# Patient Record
Sex: FEMALE | Race: WHITE | Employment: OTHER | URBAN - METROPOLITAN AREA
[De-identification: names, ages, dates, MRNs, and addresses within clinical notes are randomized per-mention and may not be internally consistent; named-entity substitution may affect disease eponyms.]

---

## 2018-04-29 ENCOUNTER — HOSPITAL ENCOUNTER (EMERGENCY)
Age: 78
Discharge: HOME | End: 2018-04-30
Attending: EMERGENCY MEDICINE
Payer: MEDICARE

## 2018-04-29 DIAGNOSIS — S81.812A LACERATION OF LEFT LOWER EXTREMITY, INITIAL ENCOUNTER: Primary | ICD-10-CM

## 2018-04-29 PROCEDURE — 75810000293 HC SIMP/SUPERF WND  RPR: Performed by: EMERGENCY MEDICINE

## 2018-04-29 PROCEDURE — 99282 EMERGENCY DEPT VISIT SF MDM: CPT | Performed by: EMERGENCY MEDICINE

## 2018-04-29 PROCEDURE — 90715 TDAP VACCINE 7 YRS/> IM: CPT | Performed by: EMERGENCY MEDICINE

## 2018-04-29 PROCEDURE — 77030019895 HC PCKNG STRP IODO -A

## 2018-04-29 PROCEDURE — 74011250636 HC RX REV CODE- 250/636: Performed by: EMERGENCY MEDICINE

## 2018-04-29 PROCEDURE — 90471 IMMUNIZATION ADMIN: CPT | Performed by: EMERGENCY MEDICINE

## 2018-04-29 RX ADMIN — TETANUS TOXOID, REDUCED DIPHTHERIA TOXOID AND ACELLULAR PERTUSSIS VACCINE, ADSORBED 0.5 ML: 5; 2.5; 8; 8; 2.5 SUSPENSION INTRAMUSCULAR at 23:20

## 2018-04-30 VITALS
OXYGEN SATURATION: 98 % | HEIGHT: 62 IN | HEART RATE: 72 BPM | BODY MASS INDEX: 18.77 KG/M2 | WEIGHT: 102 LBS | SYSTOLIC BLOOD PRESSURE: 169 MMHG | TEMPERATURE: 99 F | DIASTOLIC BLOOD PRESSURE: 85 MMHG | RESPIRATION RATE: 16 BRPM

## 2018-04-30 NOTE — ED TRIAGE NOTES
Patient arrives with lower leg laceration. States it happened earlier today. States she ran into a metal part of a boat. States that she has not had a tetanus immunization recently.

## 2018-04-30 NOTE — ED PROVIDER NOTES
HPI Comments: Hit left lower shin region on blunt object/ boat. Has \"V\" laceration that points downward. Covered with tissue paper and some tape. States was not contaminated    Patient is a 65 y.o. female presenting with skin laceration. The history is provided by the patient and a relative (son). Laceration    The incident occurred 3 to 5 hours ago. The laceration is located on the left leg. The laceration is 8 cm in size. The injury mechanism is a blunt object. The pain is at a severity of 0/10. The patient is experiencing no pain. The patient's last tetanus shot was more than 10 years ago. Past Medical History:   Diagnosis Date    CAD (coronary artery disease)     stents    Hypertension        Past Surgical History:   Procedure Laterality Date    HX HYSTERECTOMY           History reviewed. No pertinent family history. Social History     Social History    Marital status:      Spouse name: N/A    Number of children: N/A    Years of education: N/A     Occupational History    Not on file. Social History Main Topics    Smoking status: Former Smoker    Smokeless tobacco: Not on file    Alcohol use No    Drug use: No    Sexual activity: Not on file     Other Topics Concern    Not on file     Social History Narrative    No narrative on file         ALLERGIES: Review of patient's allergies indicates no known allergies. Review of Systems   Constitutional: Negative for chills and fever. Skin: Positive for wound. Neurological: Negative. Psychiatric/Behavioral: Negative for confusion and decreased concentration. All other systems reviewed and are negative. Vitals:    04/29/18 2211   BP: (!) 201/86   Pulse: 76   Resp: 18   Temp: 99 °F (37.2 °C)   SpO2: 97%   Weight: 46.3 kg (102 lb)   Height: 5' 2\" (1.575 m)            Physical Exam   Constitutional: She appears well-developed and well-nourished. No distress. HENT:   Head: Atraumatic. Eyes: No scleral icterus.    Neck: Neck supple. Cardiovascular: Normal rate. Pulmonary/Chest: Effort normal. No respiratory distress. Abdominal: She exhibits no distension. Musculoskeletal:        Legs:  Skin: Skin is warm and dry. Laceration noted. Psychiatric: Thought content normal.   Nursing note and vitals reviewed. MDM  Number of Diagnoses or Management Options  Laceration of left lower extremity, initial encounter:   Diagnosis management comments: Very thin skin. Area cleaned and not really suitable to sew. Medial with Dermabond and lateral steristripped    Tetanus updated       Amount and/or Complexity of Data Reviewed  Obtain history from someone other than the patient: yes (son)    Risk of Complications, Morbidity, and/or Mortality  Presenting problems: moderate  Diagnostic procedures: minimal  Management options: low    Patient Progress  Patient progress: improved        ED Course       Wound Repair  Date/Time: 4/29/2018 10:30 PM  Performed by: attendingPreparation: skin prepped with Shur-Clens  Pre-procedure re-eval: Immediately prior to the procedure, the patient was reevaluated and found suitable for the planned procedure and any planned medications. Time out: Immediately prior to the procedure a time out was called to verify the correct patient, procedure, equipment, staff and marking as appropriate. .  Location details: right leg and left leg  Wound length:2.6 - 7.5 cm  Skin closure: Steri-Strips and glue  Patient tolerance: Patient tolerated the procedure well with no immediate complications

## 2018-04-30 NOTE — DISCHARGE INSTRUCTIONS
Cuts Closed With Adhesives: Care Instructions  Your Care Instructions  A cut can happen anywhere on your body. The doctor used an adhesive to close the cut. When the adhesive dries, it forms a film that holds the edges of the cut together. Skin adhesives are sometimes called liquid stitches. If the cut went deep and through the skin, the doctor may have put in a layer of stitches below the adhesive. The deeper layer of stitches brings the deep part of the cut together. These stitches will dissolve and don't need to be removed. You don't see the stitches, only the adhesive. You may have a bandage. The doctor has checked you carefully, but problems can develop later. If you notice any problems or new symptoms, get medical treatment right away. Follow-up care is a key part of your treatment and safety. Be sure to make and go to all appointments, and call your doctor if you are having problems. It's also a good idea to know your test results and keep a list of the medicines you take. How can you care for yourself at home? · Keep the cut dry for the first 24 to 48 hours. After this, you can shower if your doctor okays it. Pat the cut dry. · Don't soak the cut, such as in a bathtub. Your doctor will tell you when it's safe to get the cut wet. · If your doctor told you how to care for your cut, follow your doctor's instructions. If you did not get instructions, follow this general advice:  ¨ Do not put any kind of ointment, cream, or lotion over the area. This can make the adhesive fall off too soon. ¨ After the first 24 to 48 hours, wash around the cut with clean water 2 times a day. Do not use hydrogen peroxide or alcohol, which can slow healing. ¨ If the doctor told you to use a bandage, put on a new bandage after cleaning the cut or if the bandage gets wet or dirty. · Prop up the sore area on a pillow anytime you sit or lie down during the next 3 days. Try to keep it above the level of your heart. This will help reduce swelling. · Leave the skin adhesive on your skin until it falls off on its own. This may take 5 to 10 days. · Do not scratch, rub, or pick at the adhesive. · Do not put the sticky part of a bandage directly on the adhesive. · Avoid any activity that could cause your cut to reopen. · Be safe with medicines. Read and follow all instructions on the label. ¨ If the doctor gave you a prescription medicine for pain, take it as prescribed. ¨ If you are not taking a prescription pain medicine, ask your doctor if you can take an over-the-counter medicine. When should you call for help? Call your doctor now or seek immediate medical care if:  ? · You have new pain, or your pain gets worse. ? · The skin near the cut is cold or pale or changes color. ? · You have tingling, weakness, or numbness near the cut.   ? · The cut starts to bleed. ? · You have trouble moving the area near the cut.   ? · You have symptoms of infection, such as:  ¨ Increased pain, swelling, warmth, or redness around the cut. ¨ Red streaks leading from the cut. ¨ Pus draining from the cut. ¨ A fever. ? Watch closely for changes in your health, and be sure to contact your doctor if:  ? · The cut reopens. ? · You do not get better as expected. Where can you learn more? Go to http://kassy-kvng.info/. Enter P174 in the search box to learn more about \"Cuts Closed With Adhesives: Care Instructions. \"  Current as of: March 20, 2017  Content Version: 11.4  © 3763-2228 "[employer]. Care instructions adapted under license by [employer] (which disclaims liability or warranty for this information). If you have questions about a medical condition or this instruction, always ask your healthcare professional. [address] any warranty or liability for your use of this information.        Skin Tears: Care Instructions  Your Care Instructions  As we get older, our skin gets drier and more fragile. Sometimes this can cause the outer layers of skin to split and tear open. Skin tears are treated in different ways. In some cases, doctors use pieces of tape called Steri-Strips to pull the skin together and help it heal. Other times, it's best to leave the tear open and cover it with a special wound-care bandage. Skin tears are usually not serious. They usually heal in a few weeks. But how long you take to heal depends on your body and the type of tear you have. Sometimes the torn piece of skin is used to protect the wound while it heals. But that piece of skin does not heal. It may fall off on its own. Or the doctor may remove it. As your tear heals, it's important to keep it clean to help prevent infection. The doctor has checked you carefully, but problems can develop later. If you notice any problems or new symptoms, get medical treatment right away. Follow-up care is a key part of your treatment and safety. Be sure to make and go to all appointments, and call your doctor if you are having problems. It's also a good idea to know your test results and keep a list of the medicines you take. How can you care for yourself at home? · If you have pain, ask your doctor if you can take an over-the-counter pain medicine, such as acetaminophen (Tylenol), ibuprofen (Advil, Motrin), or naproxen (Aleve). Be safe with medicines. Read and follow all instructions on the label. · If you have a bandage, follow your doctor's instructions for changing it. · If you have Steri-Strips, leave them on until they fall off. · Follow your doctor's instructions about bathing. · Gently wash the skin tear with plain water 2 times a day. Do not rub the area. · Let the area air dry. Or you can pat it carefully with a soft towel. When should you call for help?   Call your doctor now or seek immediate medical care if:  ? · You have signs of infection, such as:  ¨ Increased pain, swelling, warmth, or redness around the tear. ¨ Red streaks leading from the tear. ¨ Pus draining from the tear. ¨ A fever. ? · The tear starts to bleed a lot. Small amounts of blood are normal.   ? Watch closely for changes in your health, and be sure to contact your doctor if:  ? · You do not get better as expected. Where can you learn more? Go to http://kassy-kvng.info/. Enter I123 in the search box to learn more about \"Skin Tears: Care Instructions. \"  Current as of: March 20, 2017  Content Version: 11.4  © 0838-5094 [employer]. Care instructions adapted under license by [employer] (which disclaims liability or warranty for this information). If you have questions about a medical condition or this instruction, always ask your healthcare professional. [address] any warranty or liability for your use of this information.

## 2018-06-23 ENCOUNTER — APPOINTMENT (OUTPATIENT)
Dept: GENERAL RADIOLOGY | Age: 78
DRG: 480 | End: 2018-06-23
Attending: EMERGENCY MEDICINE
Payer: MEDICARE

## 2018-06-23 ENCOUNTER — ANESTHESIA EVENT (OUTPATIENT)
Dept: SURGERY | Age: 78
DRG: 480 | End: 2018-06-23
Payer: MEDICARE

## 2018-06-23 ENCOUNTER — HOSPITAL ENCOUNTER (INPATIENT)
Age: 78
LOS: 3 days | Discharge: NURSING, REHAB OR LONG-TERM CARE | DRG: 480 | End: 2018-06-26
Attending: EMERGENCY MEDICINE | Admitting: INTERNAL MEDICINE
Payer: MEDICARE

## 2018-06-23 DIAGNOSIS — S72.002A CLOSED FRACTURE OF NECK OF LEFT FEMUR, INITIAL ENCOUNTER (HCC): Primary | ICD-10-CM

## 2018-06-23 PROBLEM — I10 HTN (HYPERTENSION): Status: ACTIVE | Noted: 2018-06-23

## 2018-06-23 PROBLEM — E78.5 DYSLIPIDEMIA: Status: ACTIVE | Noted: 2018-06-23

## 2018-06-23 PROBLEM — F41.9 ANXIETY: Status: ACTIVE | Noted: 2018-06-23

## 2018-06-23 PROBLEM — Z87.81 HISTORY OF PELVIC FRACTURE: Status: ACTIVE | Noted: 2018-06-23

## 2018-06-23 PROBLEM — M54.9 BACK PAIN: Status: ACTIVE | Noted: 2018-06-23

## 2018-06-23 PROBLEM — R09.02 HYPOXIA: Status: ACTIVE | Noted: 2018-06-23

## 2018-06-23 PROBLEM — S72.009A HIP FRACTURE (HCC): Status: ACTIVE | Noted: 2018-06-23

## 2018-06-23 PROBLEM — F03.90 DEMENTIA (HCC): Status: ACTIVE | Noted: 2018-06-23

## 2018-06-23 PROBLEM — M81.0 OSTEOPOROSIS: Status: ACTIVE | Noted: 2018-06-23

## 2018-06-23 PROBLEM — G62.9 NEUROPATHY: Status: ACTIVE | Noted: 2018-06-23

## 2018-06-23 PROBLEM — J40 BRONCHITIS: Status: ACTIVE | Noted: 2018-06-23

## 2018-06-23 PROBLEM — I25.10 CAD (CORONARY ARTERY DISEASE): Status: ACTIVE | Noted: 2018-06-23

## 2018-06-23 LAB
ANION GAP SERPL CALC-SCNC: 9 MMOL/L (ref 7–16)
ATRIAL RATE: 68 BPM
BACTERIA URNS QL MICRO: 0 /HPF
BUN SERPL-MCNC: 16 MG/DL (ref 8–23)
CALCIUM SERPL-MCNC: 9 MG/DL (ref 8.3–10.4)
CALCIUM SERPL-MCNC: 9.2 MG/DL (ref 8.3–10.4)
CALCULATED P AXIS, ECG09: 81 DEGREES
CALCULATED R AXIS, ECG10: 70 DEGREES
CALCULATED T AXIS, ECG11: -65 DEGREES
CASTS URNS QL MICRO: 0 /LPF
CHLORIDE SERPL-SCNC: 102 MMOL/L (ref 98–107)
CO2 SERPL-SCNC: 28 MMOL/L (ref 21–32)
CREAT SERPL-MCNC: 0.73 MG/DL (ref 0.6–1)
CRYSTALS URNS QL MICRO: 0 /LPF
DIAGNOSIS, 93000: NORMAL
EPI CELLS #/AREA URNS HPF: NORMAL /HPF
ERYTHROCYTE [DISTWIDTH] IN BLOOD BY AUTOMATED COUNT: 13.6 % (ref 11.9–14.6)
GLUCOSE SERPL-MCNC: 103 MG/DL (ref 65–100)
HCT VFR BLD AUTO: 36.1 % (ref 35.8–46.3)
HGB BLD-MCNC: 12.4 G/DL (ref 11.7–15.4)
INR PPP: 1
MCH RBC QN AUTO: 31.5 PG (ref 26.1–32.9)
MCHC RBC AUTO-ENTMCNC: 34.3 G/DL (ref 31.4–35)
MCV RBC AUTO: 91.6 FL (ref 79.6–97.8)
MUCOUS THREADS URNS QL MICRO: 0 /LPF
P-R INTERVAL, ECG05: 140 MS
PLATELET # BLD AUTO: 173 K/UL (ref 150–450)
PMV BLD AUTO: 10.4 FL (ref 10.8–14.1)
POTASSIUM SERPL-SCNC: 3.9 MMOL/L (ref 3.5–5.1)
PREALB SERPL-MCNC: 14.5 MG/DL (ref 18–35.7)
PROTHROMBIN TIME: 12.6 SEC (ref 11.5–14.5)
PTH-INTACT SERPL-MCNC: 59 PG/ML (ref 18.5–88)
Q-T INTERVAL, ECG07: 382 MS
QRS DURATION, ECG06: 78 MS
QTC CALCULATION (BEZET), ECG08: 406 MS
RBC # BLD AUTO: 3.94 M/UL (ref 4.05–5.25)
RBC #/AREA URNS HPF: 0 /HPF
SODIUM SERPL-SCNC: 139 MMOL/L (ref 136–145)
VENTRICULAR RATE, ECG03: 68 BPM
WBC # BLD AUTO: 9.2 K/UL (ref 4.3–11.1)
WBC URNS QL MICRO: 0 /HPF

## 2018-06-23 PROCEDURE — 65270000029 HC RM PRIVATE

## 2018-06-23 PROCEDURE — 77010033678 HC OXYGEN DAILY

## 2018-06-23 PROCEDURE — 73552 X-RAY EXAM OF FEMUR 2/>: CPT

## 2018-06-23 PROCEDURE — 51702 INSERT TEMP BLADDER CATH: CPT | Performed by: EMERGENCY MEDICINE

## 2018-06-23 PROCEDURE — 77030036696 HC BOOT TRACT BUCKS S2SG -A

## 2018-06-23 PROCEDURE — 96374 THER/PROPH/DIAG INJ IV PUSH: CPT | Performed by: EMERGENCY MEDICINE

## 2018-06-23 PROCEDURE — 74011000302 HC RX REV CODE- 302: Performed by: INTERNAL MEDICINE

## 2018-06-23 PROCEDURE — 93005 ELECTROCARDIOGRAM TRACING: CPT | Performed by: EMERGENCY MEDICINE

## 2018-06-23 PROCEDURE — 80048 BASIC METABOLIC PNL TOTAL CA: CPT | Performed by: EMERGENCY MEDICINE

## 2018-06-23 PROCEDURE — 99285 EMERGENCY DEPT VISIT HI MDM: CPT | Performed by: EMERGENCY MEDICINE

## 2018-06-23 PROCEDURE — 86923 COMPATIBILITY TEST ELECTRIC: CPT | Performed by: EMERGENCY MEDICINE

## 2018-06-23 PROCEDURE — 74011250636 HC RX REV CODE- 250/636

## 2018-06-23 PROCEDURE — 85027 COMPLETE CBC AUTOMATED: CPT | Performed by: EMERGENCY MEDICINE

## 2018-06-23 PROCEDURE — 77030027138 HC INCENT SPIROMETER -A

## 2018-06-23 PROCEDURE — 82306 VITAMIN D 25 HYDROXY: CPT | Performed by: NURSE PRACTITIONER

## 2018-06-23 PROCEDURE — 86900 BLOOD TYPING SEROLOGIC ABO: CPT | Performed by: EMERGENCY MEDICINE

## 2018-06-23 PROCEDURE — 73502 X-RAY EXAM HIP UNI 2-3 VIEWS: CPT

## 2018-06-23 PROCEDURE — 77030005520 HC CATH URETH FOL38 BARD -A

## 2018-06-23 PROCEDURE — 77030020263 HC SOL INJ SOD CL0.9% LFCR 1000ML

## 2018-06-23 PROCEDURE — 85610 PROTHROMBIN TIME: CPT | Performed by: EMERGENCY MEDICINE

## 2018-06-23 PROCEDURE — 71045 X-RAY EXAM CHEST 1 VIEW: CPT

## 2018-06-23 PROCEDURE — 77030032490 HC SLV COMPR SCD KNE COVD -B

## 2018-06-23 PROCEDURE — 86580 TB INTRADERMAL TEST: CPT | Performed by: INTERNAL MEDICINE

## 2018-06-23 PROCEDURE — 83970 ASSAY OF PARATHORMONE: CPT | Performed by: NURSE PRACTITIONER

## 2018-06-23 PROCEDURE — 87086 URINE CULTURE/COLONY COUNT: CPT | Performed by: EMERGENCY MEDICINE

## 2018-06-23 PROCEDURE — 74011250636 HC RX REV CODE- 250/636: Performed by: INTERNAL MEDICINE

## 2018-06-23 PROCEDURE — 84134 ASSAY OF PREALBUMIN: CPT | Performed by: NURSE PRACTITIONER

## 2018-06-23 PROCEDURE — 94760 N-INVAS EAR/PLS OXIMETRY 1: CPT

## 2018-06-23 PROCEDURE — 74011250637 HC RX REV CODE- 250/637: Performed by: INTERNAL MEDICINE

## 2018-06-23 PROCEDURE — 81015 MICROSCOPIC EXAM OF URINE: CPT | Performed by: EMERGENCY MEDICINE

## 2018-06-23 PROCEDURE — 74011250637 HC RX REV CODE- 250/637: Performed by: NURSE PRACTITIONER

## 2018-06-23 PROCEDURE — 0T9B70Z DRAINAGE OF BLADDER WITH DRAINAGE DEVICE, VIA NATURAL OR ARTIFICIAL OPENING: ICD-10-PCS | Performed by: EMERGENCY MEDICINE

## 2018-06-23 PROCEDURE — 36415 COLL VENOUS BLD VENIPUNCTURE: CPT | Performed by: EMERGENCY MEDICINE

## 2018-06-23 PROCEDURE — 74011250636 HC RX REV CODE- 250/636: Performed by: NURSE PRACTITIONER

## 2018-06-23 RX ORDER — HYDROMORPHONE HYDROCHLORIDE 1 MG/ML
0.5 INJECTION, SOLUTION INTRAMUSCULAR; INTRAVENOUS; SUBCUTANEOUS
Status: DISCONTINUED | OUTPATIENT
Start: 2018-06-23 | End: 2018-06-24

## 2018-06-23 RX ORDER — GLUCOSAMINE SULFATE 1500 MG
1000 POWDER IN PACKET (EA) ORAL DAILY
COMMUNITY
End: 2018-06-26

## 2018-06-23 RX ORDER — SODIUM CHLORIDE 0.9 % (FLUSH) 0.9 %
5-10 SYRINGE (ML) INJECTION EVERY 8 HOURS
Status: DISCONTINUED | OUTPATIENT
Start: 2018-06-23 | End: 2018-06-24 | Stop reason: SDUPTHER

## 2018-06-23 RX ORDER — SODIUM CHLORIDE, SODIUM LACTATE, POTASSIUM CHLORIDE, CALCIUM CHLORIDE 600; 310; 30; 20 MG/100ML; MG/100ML; MG/100ML; MG/100ML
75 INJECTION, SOLUTION INTRAVENOUS
Status: COMPLETED | OUTPATIENT
Start: 2018-06-24 | End: 2018-06-24

## 2018-06-23 RX ORDER — CHOLECALCIFEROL (VITAMIN D3) 25 MCG
TABLET,CHEWABLE ORAL
COMMUNITY

## 2018-06-23 RX ORDER — ONDANSETRON 2 MG/ML
4 INJECTION INTRAMUSCULAR; INTRAVENOUS
Status: DISCONTINUED | OUTPATIENT
Start: 2018-06-23 | End: 2018-06-24 | Stop reason: SDUPTHER

## 2018-06-23 RX ORDER — CARVEDILOL 3.12 MG/1
3.12 TABLET ORAL 2 TIMES DAILY WITH MEALS
COMMUNITY

## 2018-06-23 RX ORDER — DIPHENHYDRAMINE HYDROCHLORIDE 50 MG/ML
12.5 INJECTION, SOLUTION INTRAMUSCULAR; INTRAVENOUS ONCE
Status: COMPLETED | OUTPATIENT
Start: 2018-06-23 | End: 2018-06-23

## 2018-06-23 RX ORDER — QUETIAPINE FUMARATE 25 MG/1
25 TABLET, FILM COATED ORAL
Status: DISCONTINUED | OUTPATIENT
Start: 2018-06-23 | End: 2018-06-26 | Stop reason: HOSPADM

## 2018-06-23 RX ORDER — LUBIPROSTONE 8 UG/1
8 CAPSULE, GELATIN COATED ORAL
COMMUNITY

## 2018-06-23 RX ORDER — ACETAMINOPHEN 325 MG/1
650 TABLET ORAL EVERY 8 HOURS
Status: DISCONTINUED | OUTPATIENT
Start: 2018-06-23 | End: 2018-06-24 | Stop reason: SDUPTHER

## 2018-06-23 RX ORDER — CEFAZOLIN SODIUM/WATER 2 G/20 ML
2 SYRINGE (ML) INTRAVENOUS
Status: COMPLETED | OUTPATIENT
Start: 2018-06-24 | End: 2018-06-24

## 2018-06-23 RX ORDER — PREGABALIN 50 MG/1
50 CAPSULE ORAL
COMMUNITY

## 2018-06-23 RX ORDER — NALOXONE HYDROCHLORIDE 0.4 MG/ML
0.4 INJECTION, SOLUTION INTRAMUSCULAR; INTRAVENOUS; SUBCUTANEOUS AS NEEDED
Status: DISCONTINUED | OUTPATIENT
Start: 2018-06-23 | End: 2018-06-26 | Stop reason: HOSPADM

## 2018-06-23 RX ORDER — ROSUVASTATIN CALCIUM 10 MG/1
10 TABLET, COATED ORAL
COMMUNITY
End: 2018-06-26

## 2018-06-23 RX ORDER — TRAMADOL HYDROCHLORIDE 50 MG/1
50 TABLET ORAL
Status: DISCONTINUED | OUTPATIENT
Start: 2018-06-23 | End: 2018-06-23 | Stop reason: SDUPTHER

## 2018-06-23 RX ORDER — QUETIAPINE FUMARATE 25 MG/1
TABLET, FILM COATED ORAL
COMMUNITY

## 2018-06-23 RX ORDER — AZITHROMYCIN 250 MG/1
250 TABLET, FILM COATED ORAL DAILY
COMMUNITY
End: 2018-06-26

## 2018-06-23 RX ORDER — ONDANSETRON 2 MG/ML
4 INJECTION INTRAMUSCULAR; INTRAVENOUS
Status: DISPENSED | OUTPATIENT
Start: 2018-06-23 | End: 2018-06-23

## 2018-06-23 RX ORDER — HYDRALAZINE HYDROCHLORIDE 20 MG/ML
10 INJECTION INTRAMUSCULAR; INTRAVENOUS
Status: DISCONTINUED | OUTPATIENT
Start: 2018-06-23 | End: 2018-06-26 | Stop reason: HOSPADM

## 2018-06-23 RX ORDER — HYDROMORPHONE HYDROCHLORIDE 2 MG/ML
INJECTION, SOLUTION INTRAMUSCULAR; INTRAVENOUS; SUBCUTANEOUS
Status: COMPLETED
Start: 2018-06-23 | End: 2018-06-23

## 2018-06-23 RX ORDER — ASPIRIN 81 MG/1
TABLET ORAL DAILY
COMMUNITY

## 2018-06-23 RX ORDER — RANOLAZINE 500 MG/1
500 TABLET, EXTENDED RELEASE ORAL 2 TIMES DAILY
COMMUNITY

## 2018-06-23 RX ORDER — RANOLAZINE 500 MG/1
500 TABLET, EXTENDED RELEASE ORAL 2 TIMES DAILY
Status: DISCONTINUED | OUTPATIENT
Start: 2018-06-23 | End: 2018-06-26 | Stop reason: HOSPADM

## 2018-06-23 RX ORDER — HYDROMORPHONE HYDROCHLORIDE 2 MG/ML
0.5 INJECTION, SOLUTION INTRAMUSCULAR; INTRAVENOUS; SUBCUTANEOUS
Status: COMPLETED | OUTPATIENT
Start: 2018-06-23 | End: 2018-06-23

## 2018-06-23 RX ORDER — SODIUM CHLORIDE 9 MG/ML
2000 INJECTION, SOLUTION INTRAVENOUS CONTINUOUS
Status: DISCONTINUED | OUTPATIENT
Start: 2018-06-23 | End: 2018-06-25

## 2018-06-23 RX ORDER — CARVEDILOL 3.12 MG/1
3.12 TABLET ORAL 2 TIMES DAILY WITH MEALS
Status: DISCONTINUED | OUTPATIENT
Start: 2018-06-23 | End: 2018-06-26 | Stop reason: HOSPADM

## 2018-06-23 RX ORDER — HYDROMORPHONE HYDROCHLORIDE 2 MG/ML
0.5 INJECTION, SOLUTION INTRAMUSCULAR; INTRAVENOUS; SUBCUTANEOUS
Status: DISCONTINUED | OUTPATIENT
Start: 2018-06-23 | End: 2018-06-23 | Stop reason: SDUPTHER

## 2018-06-23 RX ORDER — OXYCODONE HYDROCHLORIDE 5 MG/1
5 TABLET ORAL
Status: DISCONTINUED | OUTPATIENT
Start: 2018-06-23 | End: 2018-06-24 | Stop reason: SDUPTHER

## 2018-06-23 RX ORDER — TRAMADOL HYDROCHLORIDE 50 MG/1
50 TABLET ORAL
Status: DISCONTINUED | OUTPATIENT
Start: 2018-06-23 | End: 2018-06-26 | Stop reason: HOSPADM

## 2018-06-23 RX ORDER — GALANTAMINE HYDROBROMIDE 8 MG/1
8 TABLET, FILM COATED ORAL 2 TIMES DAILY
COMMUNITY

## 2018-06-23 RX ORDER — SODIUM CHLORIDE 0.9 % (FLUSH) 0.9 %
5-10 SYRINGE (ML) INJECTION AS NEEDED
Status: DISCONTINUED | OUTPATIENT
Start: 2018-06-23 | End: 2018-06-24 | Stop reason: SDUPTHER

## 2018-06-23 RX ADMIN — Medication 10 ML: at 13:43

## 2018-06-23 RX ADMIN — HYDROMORPHONE HYDROCHLORIDE 0.5 MG: 2 INJECTION, SOLUTION INTRAMUSCULAR; INTRAVENOUS; SUBCUTANEOUS at 06:49

## 2018-06-23 RX ADMIN — TUBERCULIN PURIFIED PROTEIN DERIVATIVE 5 UNITS: 5 INJECTION INTRADERMAL at 13:33

## 2018-06-23 RX ADMIN — DIPHENHYDRAMINE HYDROCHLORIDE 12.5 MG: 50 INJECTION, SOLUTION INTRAMUSCULAR; INTRAVENOUS at 13:33

## 2018-06-23 RX ADMIN — HYDROMORPHONE HYDROCHLORIDE 0.5 MG: 1 INJECTION, SOLUTION INTRAMUSCULAR; INTRAVENOUS; SUBCUTANEOUS at 20:55

## 2018-06-23 RX ADMIN — Medication 5 ML: at 21:02

## 2018-06-23 RX ADMIN — SODIUM CHLORIDE 2000 ML: 900 INJECTION, SOLUTION INTRAVENOUS at 11:22

## 2018-06-23 RX ADMIN — HYDROMORPHONE HYDROCHLORIDE 0.5 MG: 2 INJECTION, SOLUTION INTRAMUSCULAR; INTRAVENOUS; SUBCUTANEOUS at 10:39

## 2018-06-23 RX ADMIN — AZITHROMYCIN MONOHYDRATE 500 MG: 500 INJECTION, POWDER, LYOPHILIZED, FOR SOLUTION INTRAVENOUS at 13:33

## 2018-06-23 RX ADMIN — QUETIAPINE FUMARATE 25 MG: 25 TABLET ORAL at 21:01

## 2018-06-23 RX ADMIN — HYDROMORPHONE HYDROCHLORIDE 0.5 MG: 1 INJECTION, SOLUTION INTRAMUSCULAR; INTRAVENOUS; SUBCUTANEOUS at 17:08

## 2018-06-23 RX ADMIN — ACETAMINOPHEN 650 MG: 325 TABLET ORAL at 21:01

## 2018-06-23 NOTE — ANESTHESIA PREPROCEDURE EVALUATION
Anesthetic History   No history of anesthetic complications            Review of Systems / Medical History  Patient summary reviewed and pertinent labs reviewed    Pulmonary    COPD: moderate      Smoker (quit 30 years)         Neuro/Psych         Dementia (pleasantly demented)     Cardiovascular    Hypertension: well controlled          CAD, cardiac stents (8 stents, will give asa this am.) and hyperlipidemia    Exercise tolerance: <4 METS  Comments: 2015 echo: Global left ventricular wall motion and contractility are within   normal   limits. The estimated LV ejection fraction is 60-65%. Grade 1 diastolic dysfunction is present consistent with impaired LV  relaxation. GI/Hepatic/Renal                Endo/Other  Within defined limits           Other Findings   Comments: Saw pcp 6/22 for bronchitis, given z-pack. Physical Exam    Airway  Mallampati: III  TM Distance: < 4 cm  Neck ROM: normal range of motion   Mouth opening: Normal     Cardiovascular    Rhythm: regular  Rate: normal         Dental         Pulmonary    Rhonchi:bilateral             Abdominal         Other Findings            Anesthetic Plan    ASA: 3  Anesthesia type: spinal            Anesthetic plan and risks discussed with: Patient and Family      Discussed btoh Spinal, sedation, and GA.

## 2018-06-23 NOTE — ED TRIAGE NOTES
Family stated that patient was walking to the bathroom and then wanted to go to the couch but fell before she got there. Family brought patient to ER by car.

## 2018-06-23 NOTE — ED PROVIDER NOTES
Patient is a 65 y.o. female presenting with fall. The history is provided by the patient. Fall   The accident occurred less than 1 hour ago. The fall occurred while walking. She fell from a height of ground level. She landed on hard floor. There was no blood loss. The point of impact was the left hip. The pain is present in the left hip. The pain is severe. She was not ambulatory at the scene. Pertinent negatives include no numbness, no nausea, no vomiting, no headaches (no loss of consciousness), no loss of consciousness, no tingling and no laceration. The risk factors include recurrent falls. The symptoms are aggravated by activity and use of injured limb. She has tried nothing for the symptoms. Past Medical History:   Diagnosis Date    CAD (coronary artery disease)     stents    Hypertension        Past Surgical History:   Procedure Laterality Date    HX HYSTERECTOMY           No family history on file. Social History     Social History    Marital status:      Spouse name: N/A    Number of children: N/A    Years of education: N/A     Occupational History    Not on file. Social History Main Topics    Smoking status: Former Smoker    Smokeless tobacco: Not on file    Alcohol use No    Drug use: No    Sexual activity: Not on file     Other Topics Concern    Not on file     Social History Narrative    No narrative on file         ALLERGIES: Review of patient's allergies indicates no known allergies. Review of Systems   HENT: Negative for facial swelling. Respiratory: Negative for shortness of breath. Cardiovascular: Negative for chest pain. Gastrointestinal: Negative for nausea and vomiting. Musculoskeletal: Negative for back pain and neck pain. Neurological: Negative for tingling, loss of consciousness, weakness, numbness and headaches (no loss of consciousness).        Vitals:    06/23/18 0638   BP: (!) 217/103            Physical Exam   Constitutional: She is oriented to person, place, and time. She appears well-developed and well-nourished. HENT:   Head: Normocephalic and atraumatic. Eyes: Conjunctivae and EOM are normal. Pupils are equal, round, and reactive to light. Neck: Trachea normal. No spinous process tenderness and no muscular tenderness present. Cardiovascular: Normal rate, regular rhythm, normal heart sounds and intact distal pulses. Pulmonary/Chest: Effort normal and breath sounds normal. She exhibits no tenderness. Abdominal: Soft. Bowel sounds are normal. She exhibits no distension. There is no tenderness. There is no rebound and no guarding. Musculoskeletal: Normal range of motion. She exhibits tenderness. She exhibits no edema. Cervical back: She exhibits tenderness. Neurological: She is alert and oriented to person, place, and time. She has normal strength. No sensory deficit. GCS eye subscore is 4. GCS verbal subscore is 5. GCS motor subscore is 6. Skin: Skin is warm and dry. No laceration noted. Nursing note and vitals reviewed. MDM  Number of Diagnoses or Management Options  Diagnosis management comments: 69-year-old female presents with a shortened and externally rotated  Left leg after a fall landing on her left hip. Left hip fracture clinically suspected. Pain medication ordered. X-rays labwork EKG ordered. No concern for rhabdomyolysis as the patient's son heard her fall and got to her immediately. The fall was about 30 minutes prior to arrival.  I encountered the patient and her son in the driveway just outside the emergency department on my way into my morning shift.        Amount and/or Complexity of Data Reviewed  Clinical lab tests: ordered and reviewed (Results for orders placed or performed during the hospital encounter of 06/23/18  -CBC W/O DIFF       Result                                            Value                         Ref Range                       WBC 9.2                           4.3 - 11.1 K/uL                 RBC                                               3.94 (L)                      4.05 - 5.25 M/uL                HGB                                               12.4                          11.7 - 15.4 g/dL                HCT                                               36.1                          35.8 - 46.3 %                   MCV                                               91.6                          79.6 - 97.8 FL                  MCH                                               31.5                          26.1 - 32.9 PG                  MCHC                                              34.3                          31.4 - 35.0 g/dL                RDW                                               13.6                          11.9 - 14.6 %                   PLATELET                                          173                           150 - 450 K/uL                  MPV                                               10.4 (L)                      10.8 - 14.1 FL             -METABOLIC PANEL, BASIC       Result                                            Value                         Ref Range                       Sodium                                            139                           136 - 145 mmol/L                Potassium                                         3.9                           3.5 - 5.1 mmol/L                Chloride                                          102                           98 - 107 mmol/L                 CO2                                               28                            21 - 32 mmol/L                  Anion gap                                         9                             7 - 16 mmol/L                   Glucose                                           103 (H)                       65 - 100 mg/dL                  BUN                                               16 8 - 23 MG/DL                    Creatinine                                        0.73                          0.6 - 1.0 MG/DL                 GFR est AA                                        >60                           >60 ml/min/1.73m2               GFR est non-AA                                    >60                           >60 ml/min/1.73m2               Calcium                                           9.0                           8.3 - 10.4 MG/DL           -PROTHROMBIN TIME + INR       Result                                            Value                         Ref Range                       Prothrombin time                                  12.6                          11.5 - 14.5 sec                 INR                                               1.0                                                      )  Tests in the radiology section of CPT®: ordered and reviewed (Xr Chest Sngl V    Result Date: 6/23/2018  Left femur: 6/23/2018 INDICATION: Trauma 4 images are remarkable for left ischial pubic ramus fractures. There is a subacute fracture of the left femoral neck with varus angulation of the distal fracture fragments. IMPRESSION: Subacute left femoral neck fracture. Portable chest x-ray Lung fields clear. There is lower thoracic scoliosis. Cardiac silhouette and soft tissues are intact IMPRESSION: lower thoracic scoliosis, clear lung fields     Xr Hip Lt W Or Wo Pelv 2-3 Vws    Result Date: 6/23/2018  Left hip June 23, 2018 Indication pain 3 views demonstrate a remote left ischial pubic ramus fracture. There is an acute displaced fracture at the base of the left femoral neck with varus angulation of the distal fracture fragment. Soft tissues are edematous     IMPRESSION: Left femoral neck fracture, remote pelvic fractures    Xr Femur Lt 2 V    Result Date: 6/23/2018  Left femur: 6/23/2018 INDICATION: Trauma 4 images are remarkable for left ischial pubic ramus fractures. There is a subacute fracture of the left femoral neck with varus angulation of the distal fracture fragments. IMPRESSION: Subacute left femoral neck fracture. Portable chest x-ray Lung fields clear. There is lower thoracic scoliosis.  Cardiac silhouette and soft tissues are intact IMPRESSION: lower thoracic scoliosis, clear lung fields     )          ED Course       Procedures

## 2018-06-23 NOTE — PROGRESS NOTES
ORTHO:    PATIENT TO BE ADMITTED BY HOSPITALIST FOR LEFT HIP FRACTURE TO ROOM 726. SURGERY PLANNED WITH DR. OROZCO TOMORROW. PLEASE KEEP NPO AFTER MIDNIGHT.

## 2018-06-23 NOTE — ROUTINE PROCESS
Patient is scratching all over. Dr. Maria Fernanda Liz notified and gave a verbal order for Benadryl 12.5 IV once.

## 2018-06-23 NOTE — ED TRIAGE NOTES
Medication Rec updated from [facility] chart. Pt has list of problems in [facility] chart as this is her normal facility of choice per sister at bedside.

## 2018-06-23 NOTE — ED NOTES
Sister at bedside, she is patient's primary caregiver even though pt lives with her son. Per Anita Silver pt has dementia and is not ever left alone. Pt alert and able to answer questions about her history.

## 2018-06-23 NOTE — PROGRESS NOTES
06/23/18 1333   Dual Skin Pressure Injury Assessment   Dual Skin Pressure Injury Assessment WDL   Second Care Provider (Based on [address] [place] [address]) Kody Mcpherson RN       Abrasion to L Arm.

## 2018-06-23 NOTE — H&P
History and Physical    Patient: Jere Jarvis MRN: [id number]  SSN: [id number]    YOB: 1940  Age: 65 y.o. Sex: female      Subjective:   Cc: she fell at home\"    Jere Jarvis is a 65 y.o. female who has a PMH of CAD, s/p 8 stents, HTN, dementia, osteoporosis, neuropathy, anxiety and multiple mechanical falls with 3 hip fractures in the past, who came after she woke up and fell while going into the living room this morning. She lives with her son, which takes care of her. Patient was found lying on the floor, unable to walk with left leg externally rotated. She denied head trauma, LOC, dizziness, chest pain, sob, abdominal pain, diarrhea. Her daughter and sister told me, patient visited her outside MD yesterday and she was prescribed zithromax for a diagnosis of bronchitis. ROS: all pertinent findings described in my note    PMH: as above  Social hx: prior smoker, quit in 1976; no alcohol use  Family hx: her father had heart disease and lung cancer. Reviewed    Upon arrival VS: /130   HR 69  T 98F  02: 100% room air  RR20. BP decreased to 172/77 after pain medication was given ( dilaudid ). On my assessment, she was found lying on a stretcher, drowsy, O2 :88% room air, /90 mmHg. Labs: unremarkable, cxr: no infiltrates. UA: normal.  EKG: not done  Left hip xr: left subacute left femoral neck fracture     Hospitalist was contacted for admission in view of left hip fracture with plan for surgery tomorrow. Past Medical History:   Diagnosis Date    CAD (coronary artery disease)     stents    Hypertension      Past Surgical History:   Procedure Laterality Date    HX HYSTERECTOMY        No family history on file. Social History   Substance Use Topics    Smoking status: Former Smoker    Smokeless tobacco: Not on file    Alcohol use No      Prior to Admission medications    Medication Sig Start Date End Date Taking?  Authorizing Provider   aspirin delayed-release 81 mg tablet Take  by mouth daily. Yes Pawel Bethea MD   cholecalciferol (VITAMIN D3) 1,000 unit cap Take 1,000 Units by mouth daily. Yes Pawel Bethea MD   cyanocobalamin, vitamin B-12, 2,500 mcg tab Take  by mouth. Yes Pawel Bethea MD   lubiPROStone (AMITIZA) 8 mcg capsule Take 8 mcg by mouth. Yes Pawel Bethea MD   ranolazine ER (RANEXA) 500 mg SR tablet Take 500 mg by mouth two (2) times a day. Yes Pawel Bethea MD   carvedilol (COREG) 3.125 mg tablet Take 3.125 mg by mouth two (2) times daily (with meals). Yes Pawel Bethea MD   QUEtiapine (SEROQUEL) 25 mg tablet Take  by mouth. Yes Pawel Bethea MD   rosuvastatin (CRESTOR) 10 mg tablet Take 10 mg by mouth nightly. Yes Pawel Bethea MD   pregabalin (LYRICA) 50 mg capsule Take 50 mg by mouth. Yes Pawel Bethea MD   galantamine (RAZADYNE) 8 mg tablet Take 8 mg by mouth two (2) times a day. Yes Pawel Bethea MD   azithromycin (ZITHROMAX) 250 mg tablet Take 250 mg by mouth daily. FIRST DOSE ON 06/22  MG, DX BRONCHITIS   Yes Pawel Bethea MD        Allergies   Allergen Reactions    Donepezil Anaphylaxis    Memantine Anaphylaxis    Tegaserod Hydrogen Maleate Other (comments)     Lethargy      Atorvastatin Myalgia    Calcitonin ([place]) Unknown (comments)    Colesevelam Myalgia    Ezetimibe Myalgia    Fluvastatin Myalgia    Ibandronate Sodium Other (comments)     Abdominal Pain    Risedronate Other (comments)     Abdominal pain      Rosuvastatin Myalgia    Rosuvastatin Calcium Myalgia    Sertraline Anxiety and Other (comments)     Agitation      Simvastatin Myalgia    Sulfa (Sulfonamide Antibiotics) Nausea and Vomiting       Review of Systems:  Unable to obtain due to drowsiness.      Objective:     Vitals:    06/23/18 0929 06/23/18 0959 06/23/18 1030 06/23/18 1101   BP: 172/77 160/78 191/88 130/71   Pulse: 65 65 67 69   Resp:       SpO2: 92% 94% 95% (!) 88%   Weight:       Height:            Physical Exam:  GENERAL: drowsy, but easily arousable, possible due to her recent pain medication   EYE: negative  LYMPHATIC: Cervical, supraclavicular, and axillary nodes normal.   THROAT & NECK: normal and no erythema or exudates noted. LUNG: bilateral air entry, minimal rhonchi, no rales, no wheezing   HEART: regular rate and rhythm, S1, S2 normal, no murmur, click, rub or gallop  ABDOMEN: soft, non-tender. Bowel sounds normal. No masses,  no organomegaly  EXTREMITIES:  Left wrist noted with external deformity. Left leg externally rotated, no edema   SKIN: skin abrasions over her arms. NEUROLOGIC: drowsy, unable to assess complete neurological test. She was able to move her extremities. PSYCHIATRIC: non focal    Assessment:     Hospital Problems  Never Reviewed          Codes Class Noted POA    * (Principal)Hip fracture ([facility] [address] ICD-10-CM: S72.009A  ICD-9-CM: 820.8  6/23/2018 Unknown        HTN (hypertension) ICD-10-CM: I10  ICD-9-CM: 401.9  6/23/2018 Unknown        Dementia ICD-10-CM: F03.90  ICD-9-CM: 294.20  6/23/2018 Unknown        CAD (coronary artery disease) ICD-10-CM: I25.10  ICD-9-CM: 414.00  6/23/2018 Unknown        Dyslipidemia ICD-10-CM: E78.5  ICD-9-CM: 272.4  6/23/2018 Unknown        Neuropathy ICD-10-CM: G62.9  ICD-9-CM: 355.9  6/23/2018 Unknown        Anxiety ICD-10-CM: F41.9  ICD-9-CM: 300.00  6/23/2018 Unknown        Back pain ICD-10-CM: M54.9  ICD-9-CM: 724.5  6/23/2018 Unknown        Osteoporosis ICD-10-CM: M81.0  ICD-9-CM: 733.00  6/23/2018 Unknown        History of pelvic fracture ICD-10-CM: Z87.81  ICD-9-CM: V15.51  6/23/2018 Unknown        Bronchitis ICD-10-CM: J40  ICD-9-CM: 049  6/23/2018 Yes        Hypoxia ICD-10-CM: R09.02  ICD-9-CM: 799.02  6/23/2018 Yes              Plan:   1. Left hip fracture after a mechanical fall  2. History of recent bronchitis, started on zithromax yesterday by her outside MD  3. Acute hypoxic respiratory failure, possible related to her underlying bronchitis, opiate use  4. CAD, no chest pain today  5. HTN, out of control, possible due to pain  6. Dementia     Plan:  Admit as inpatient  Keep her NPO for now until she is more awake  Bedrest  Supplemental oxygen and wean prn   Duonebs, zithromax for a total of 3 days   Pain control and DVT ppx by orthopedics protocol  zofran prn  Resume HTN meds, hydralazine iv prn  EKG  PPD    Code status: full for now, they will bring her living will from home. Estimated LOS > 2MN  Risk: high  Estimated DC planning: STR  Goals of care discussed with family members     Pre-operative evaluation:  Patient has no active cardiac issues like chest pain, sob, palptitations. ekg is NRS, un-specific st changes  Revised cardiac index risk: 1  Patient has intermediate risk to undergo hip fracture surgery. Currently she is hypoxic, possible related to her recent dose of dilaudid. She was found drowsy. Upon arrival her O2 sat was 100% on room air. Suggest:  She may have her coreg medication prior to surgery  Keep O2 > 90 perioperatively  Avoid overuse of opiates, if not at all.  May select an alternative for pain control in the mean time  DVT ppx by orthopedics protocol    Signed By: Sunny Deras MD     June 23, 2018

## 2018-06-23 NOTE — ED NOTES
TRANSFER - OUT REPORT:    Verbal report given to Demetria RN(name) on Ad Gar  being transferred to 726(unit) for routine progression of care       Report consisted of patients Situation, Background, Assessment and   Recommendations(SBAR). Information from the following report(s) SBAR, ED Summary, [facility] and Recent Results was reviewed with the receiving nurse. Lines:   Peripheral IV 06/23/18 Left Hand (Active)   Site Assessment Clean, dry, & intact 6/23/2018  9:42 AM   Phlebitis Assessment 0 6/23/2018  9:42 AM   Infiltration Assessment 0 6/23/2018  9:42 AM   Dressing Status Clean, dry, & intact 6/23/2018  9:42 AM   Dressing Type Transparent;Tape 6/23/2018  9:42 AM   Hub Color/Line Status Pink;Flushed 6/23/2018  9:42 AM   Action Taken Blood drawn 6/23/2018  9:42 AM       Peripheral IV 06/23/18 Right Forearm (Active)        Opportunity for questions and clarification was provided.       Patient transported with:   [employer]

## 2018-06-24 ENCOUNTER — ANESTHESIA (OUTPATIENT)
Dept: SURGERY | Age: 78
DRG: 480 | End: 2018-06-24
Payer: MEDICARE

## 2018-06-24 ENCOUNTER — APPOINTMENT (OUTPATIENT)
Dept: GENERAL RADIOLOGY | Age: 78
DRG: 480 | End: 2018-06-24
Attending: ORTHOPAEDIC SURGERY
Payer: MEDICARE

## 2018-06-24 LAB
APTT PPP: 39.1 SEC (ref 23.2–35.3)
MM INDURATION POC: 0 MM (ref 0–5)
PPD POC: NEGATIVE NEGATIVE

## 2018-06-24 PROCEDURE — 97530 THERAPEUTIC ACTIVITIES: CPT

## 2018-06-24 PROCEDURE — 87641 MR-STAPH DNA AMP PROBE: CPT | Performed by: NURSE PRACTITIONER

## 2018-06-24 PROCEDURE — 0QS736Z REPOSITION LEFT UPPER FEMUR WITH INTRAMEDULLARY INTERNAL FIXATION DEVICE, PERCUTANEOUS APPROACH: ICD-10-PCS | Performed by: ORTHOPAEDIC SURGERY

## 2018-06-24 PROCEDURE — 76060000033 HC ANESTHESIA 1 TO 1.5 HR: Performed by: ORTHOPAEDIC SURGERY

## 2018-06-24 PROCEDURE — 85730 THROMBOPLASTIN TIME PARTIAL: CPT | Performed by: NURSE PRACTITIONER

## 2018-06-24 PROCEDURE — 77030002933 HC SUT MCRYL J&J -A: Performed by: ORTHOPAEDIC SURGERY

## 2018-06-24 PROCEDURE — 77030020782 HC GWN BAIR PAWS FLX 3M -B: Performed by: NURSE ANESTHETIST, CERTIFIED REGISTERED

## 2018-06-24 PROCEDURE — 74011250636 HC RX REV CODE- 250/636: Performed by: NURSE PRACTITIONER

## 2018-06-24 PROCEDURE — 77030035168: Performed by: ORTHOPAEDIC SURGERY

## 2018-06-24 PROCEDURE — 74011000250 HC RX REV CODE- 250: Performed by: NURSE PRACTITIONER

## 2018-06-24 PROCEDURE — 77030020255 HC SOL INJ LR 1000ML BG

## 2018-06-24 PROCEDURE — 74011000250 HC RX REV CODE- 250

## 2018-06-24 PROCEDURE — 36415 COLL VENOUS BLD VENIPUNCTURE: CPT | Performed by: NURSE PRACTITIONER

## 2018-06-24 PROCEDURE — 74011250636 HC RX REV CODE- 250/636

## 2018-06-24 PROCEDURE — 77030011640 HC PAD GRND REM COVD -A: Performed by: ORTHOPAEDIC SURGERY

## 2018-06-24 PROCEDURE — C1713 ANCHOR/SCREW BN/BN,TIS/BN: HCPCS | Performed by: ORTHOPAEDIC SURGERY

## 2018-06-24 PROCEDURE — 74011250637 HC RX REV CODE- 250/637: Performed by: ANESTHESIOLOGY

## 2018-06-24 PROCEDURE — 74011250637 HC RX REV CODE- 250/637: Performed by: INTERNAL MEDICINE

## 2018-06-24 PROCEDURE — 76010000161 HC OR TIME 1 TO 1.5 HR INTENSV-TIER 1: Performed by: ORTHOPAEDIC SURGERY

## 2018-06-24 PROCEDURE — C1769 GUIDE WIRE: HCPCS | Performed by: ORTHOPAEDIC SURGERY

## 2018-06-24 PROCEDURE — 74011250636 HC RX REV CODE- 250/636: Performed by: INTERNAL MEDICINE

## 2018-06-24 PROCEDURE — 76210000006 HC OR PH I REC 0.5 TO 1 HR: Performed by: ORTHOPAEDIC SURGERY

## 2018-06-24 PROCEDURE — 97166 OT EVAL MOD COMPLEX 45 MIN: CPT

## 2018-06-24 PROCEDURE — 74011250636 HC RX REV CODE- 250/636: Performed by: ANESTHESIOLOGY

## 2018-06-24 PROCEDURE — 73552 X-RAY EXAM OF FEMUR 2/>: CPT

## 2018-06-24 PROCEDURE — 74011250637 HC RX REV CODE- 250/637: Performed by: NURSE PRACTITIONER

## 2018-06-24 PROCEDURE — 97162 PT EVAL MOD COMPLEX 30 MIN: CPT

## 2018-06-24 PROCEDURE — 65270000029 HC RM PRIVATE

## 2018-06-24 PROCEDURE — 77030018836 HC SOL IRR NACL ICUM -A: Performed by: ORTHOPAEDIC SURGERY

## 2018-06-24 PROCEDURE — 74011250636 HC RX REV CODE- 250/636: Performed by: ORTHOPAEDIC SURGERY

## 2018-06-24 PROCEDURE — 77030008467 HC STPLR SKN COVD -B: Performed by: ORTHOPAEDIC SURGERY

## 2018-06-24 PROCEDURE — 77030007880 HC KT SPN EPDRL BBMI -B: Performed by: NURSE ANESTHETIST, CERTIFIED REGISTERED

## 2018-06-24 PROCEDURE — 77030014405 HC GD ROD RMR SYNT -C: Performed by: ORTHOPAEDIC SURGERY

## 2018-06-24 PROCEDURE — 77030011256 HC DRSG MEPILEX <16IN NO BORD MOLN -A

## 2018-06-24 DEVICE — IMPLANTABLE DEVICE: Type: IMPLANTABLE DEVICE | Site: FEMUR | Status: FUNCTIONAL

## 2018-06-24 RX ORDER — GUAIFENESIN 100 MG/5ML
81 LIQUID (ML) ORAL
Status: COMPLETED | OUTPATIENT
Start: 2018-06-24 | End: 2018-06-24

## 2018-06-24 RX ORDER — LIDOCAINE HYDROCHLORIDE 10 MG/ML
0.1 INJECTION INFILTRATION; PERINEURAL AS NEEDED
Status: DISCONTINUED | OUTPATIENT
Start: 2018-06-24 | End: 2018-06-24 | Stop reason: HOSPADM

## 2018-06-24 RX ORDER — SODIUM CHLORIDE 0.9 % (FLUSH) 0.9 %
5-10 SYRINGE (ML) INJECTION EVERY 8 HOURS
Status: DISCONTINUED | OUTPATIENT
Start: 2018-06-24 | End: 2018-06-24 | Stop reason: HOSPADM

## 2018-06-24 RX ORDER — SODIUM CHLORIDE 0.9 % (FLUSH) 0.9 %
5-10 SYRINGE (ML) INJECTION AS NEEDED
Status: DISCONTINUED | OUTPATIENT
Start: 2018-06-24 | End: 2018-06-26 | Stop reason: HOSPADM

## 2018-06-24 RX ORDER — PROPOFOL 10 MG/ML
INJECTION, EMULSION INTRAVENOUS
Status: DISCONTINUED | OUTPATIENT
Start: 2018-06-24 | End: 2018-06-24 | Stop reason: HOSPADM

## 2018-06-24 RX ORDER — FERROUS SULFATE, DRIED 160(50) MG
1 TABLET, EXTENDED RELEASE ORAL
Status: DISCONTINUED | OUTPATIENT
Start: 2018-06-24 | End: 2018-06-26 | Stop reason: HOSPADM

## 2018-06-24 RX ORDER — MAG HYDROX/ALUMINUM HYD/SIMETH 200-200-20
30 SUSPENSION, ORAL (FINAL DOSE FORM) ORAL
Status: DISCONTINUED | OUTPATIENT
Start: 2018-06-24 | End: 2018-06-26 | Stop reason: HOSPADM

## 2018-06-24 RX ORDER — DOCUSATE SODIUM 100 MG/1
100 CAPSULE, LIQUID FILLED ORAL 2 TIMES DAILY
Status: DISCONTINUED | OUTPATIENT
Start: 2018-06-24 | End: 2018-06-26 | Stop reason: HOSPADM

## 2018-06-24 RX ORDER — SODIUM CHLORIDE, SODIUM LACTATE, POTASSIUM CHLORIDE, CALCIUM CHLORIDE 600; 310; 30; 20 MG/100ML; MG/100ML; MG/100ML; MG/100ML
75 INJECTION, SOLUTION INTRAVENOUS CONTINUOUS
Status: DISCONTINUED | OUTPATIENT
Start: 2018-06-24 | End: 2018-06-25

## 2018-06-24 RX ORDER — ENOXAPARIN SODIUM 100 MG/ML
30 INJECTION SUBCUTANEOUS EVERY 24 HOURS
Status: DISCONTINUED | OUTPATIENT
Start: 2018-06-25 | End: 2018-06-26 | Stop reason: HOSPADM

## 2018-06-24 RX ORDER — SODIUM CHLORIDE 9 MG/ML
50 INJECTION, SOLUTION INTRAVENOUS CONTINUOUS
Status: DISCONTINUED | OUTPATIENT
Start: 2018-06-24 | End: 2018-06-24 | Stop reason: HOSPADM

## 2018-06-24 RX ORDER — SODIUM CHLORIDE, SODIUM LACTATE, POTASSIUM CHLORIDE, CALCIUM CHLORIDE 600; 310; 30; 20 MG/100ML; MG/100ML; MG/100ML; MG/100ML
150 INJECTION, SOLUTION INTRAVENOUS CONTINUOUS
Status: DISCONTINUED | OUTPATIENT
Start: 2018-06-24 | End: 2018-06-24 | Stop reason: HOSPADM

## 2018-06-24 RX ORDER — HALOPERIDOL 5 MG/ML
0.5 INJECTION INTRAMUSCULAR
Status: DISCONTINUED | OUTPATIENT
Start: 2018-06-24 | End: 2018-06-26 | Stop reason: HOSPADM

## 2018-06-24 RX ORDER — SODIUM CHLORIDE 0.9 % (FLUSH) 0.9 %
5-10 SYRINGE (ML) INJECTION AS NEEDED
Status: DISCONTINUED | OUTPATIENT
Start: 2018-06-24 | End: 2018-06-24 | Stop reason: HOSPADM

## 2018-06-24 RX ORDER — FAMOTIDINE 20 MG/1
20 TABLET, FILM COATED ORAL ONCE
Status: COMPLETED | OUTPATIENT
Start: 2018-06-24 | End: 2018-06-24

## 2018-06-24 RX ORDER — TETRACAINE HCL 10 MG/ML
INJECTION SUBARACHNOID AS NEEDED
Status: DISCONTINUED | OUTPATIENT
Start: 2018-06-24 | End: 2018-06-24 | Stop reason: HOSPADM

## 2018-06-24 RX ORDER — ACETAMINOPHEN 500 MG
1000 TABLET ORAL
Status: DISCONTINUED | OUTPATIENT
Start: 2018-06-24 | End: 2018-06-24 | Stop reason: HOSPADM

## 2018-06-24 RX ORDER — ONDANSETRON 2 MG/ML
4 INJECTION INTRAMUSCULAR; INTRAVENOUS
Status: DISCONTINUED | OUTPATIENT
Start: 2018-06-24 | End: 2018-06-26 | Stop reason: HOSPADM

## 2018-06-24 RX ORDER — ACETAMINOPHEN 325 MG/1
650 TABLET ORAL EVERY 8 HOURS
Status: DISCONTINUED | OUTPATIENT
Start: 2018-06-24 | End: 2018-06-26

## 2018-06-24 RX ORDER — PROPOFOL 10 MG/ML
INJECTION, EMULSION INTRAVENOUS AS NEEDED
Status: DISCONTINUED | OUTPATIENT
Start: 2018-06-24 | End: 2018-06-24 | Stop reason: HOSPADM

## 2018-06-24 RX ORDER — BUPIVACAINE HYDROCHLORIDE 7.5 MG/ML
INJECTION, SOLUTION INTRASPINAL AS NEEDED
Status: DISCONTINUED | OUTPATIENT
Start: 2018-06-24 | End: 2018-06-24 | Stop reason: HOSPADM

## 2018-06-24 RX ORDER — CLONAZEPAM 1 MG/1
0.5 TABLET ORAL
Status: DISCONTINUED | OUTPATIENT
Start: 2018-06-24 | End: 2018-06-26 | Stop reason: HOSPADM

## 2018-06-24 RX ORDER — SODIUM CHLORIDE 0.9 % (FLUSH) 0.9 %
5-10 SYRINGE (ML) INJECTION EVERY 8 HOURS
Status: DISCONTINUED | OUTPATIENT
Start: 2018-06-24 | End: 2018-06-26 | Stop reason: HOSPADM

## 2018-06-24 RX ORDER — LIDOCAINE HYDROCHLORIDE 20 MG/ML
INJECTION, SOLUTION EPIDURAL; INFILTRATION; INTRACAUDAL; PERINEURAL AS NEEDED
Status: DISCONTINUED | OUTPATIENT
Start: 2018-06-24 | End: 2018-06-24 | Stop reason: HOSPADM

## 2018-06-24 RX ORDER — HYDROCODONE BITARTRATE AND ACETAMINOPHEN 5; 325 MG/1; MG/1
1 TABLET ORAL AS NEEDED
Status: DISCONTINUED | OUTPATIENT
Start: 2018-06-24 | End: 2018-06-24 | Stop reason: HOSPADM

## 2018-06-24 RX ORDER — HYDROMORPHONE HYDROCHLORIDE 2 MG/ML
0.5 INJECTION, SOLUTION INTRAMUSCULAR; INTRAVENOUS; SUBCUTANEOUS
Status: DISCONTINUED | OUTPATIENT
Start: 2018-06-24 | End: 2018-06-24 | Stop reason: HOSPADM

## 2018-06-24 RX ORDER — OXYCODONE HYDROCHLORIDE 5 MG/1
5 TABLET ORAL
Status: DISCONTINUED | OUTPATIENT
Start: 2018-06-24 | End: 2018-06-26 | Stop reason: HOSPADM

## 2018-06-24 RX ADMIN — CLONAZEPAM 0.5 MG: 1 TABLET ORAL at 18:02

## 2018-06-24 RX ADMIN — ACETAMINOPHEN 650 MG: 325 TABLET ORAL at 22:18

## 2018-06-24 RX ADMIN — ASPIRIN 81 MG 81 MG: 81 TABLET ORAL at 07:41

## 2018-06-24 RX ADMIN — CALCIUM CARBONATE 500 MG (1,250 MG)-VITAMIN D3 200 UNIT TABLET 1 TABLET: at 17:16

## 2018-06-24 RX ADMIN — TETRACAINE HCL 5 MG: 10 INJECTION SUBARACHNOID at 08:16

## 2018-06-24 RX ADMIN — FAMOTIDINE 20 MG: 20 TABLET, FILM COATED ORAL at 05:55

## 2018-06-24 RX ADMIN — OXYCODONE HYDROCHLORIDE 5 MG: 5 TABLET ORAL at 22:18

## 2018-06-24 RX ADMIN — BUPIVACAINE HYDROCHLORIDE 0.6 ML: 7.5 INJECTION, SOLUTION INTRASPINAL at 08:16

## 2018-06-24 RX ADMIN — TRAMADOL HYDROCHLORIDE 50 MG: 50 TABLET, FILM COATED ORAL at 14:43

## 2018-06-24 RX ADMIN — CARVEDILOL 3.12 MG: 3.12 TABLET, FILM COATED ORAL at 05:55

## 2018-06-24 RX ADMIN — WATER 1 G: 1 INJECTION INTRAMUSCULAR; INTRAVENOUS; SUBCUTANEOUS at 17:15

## 2018-06-24 RX ADMIN — RANOLAZINE 500 MG: 500 TABLET, FILM COATED, EXTENDED RELEASE ORAL at 17:17

## 2018-06-24 RX ADMIN — ACETAMINOPHEN 650 MG: 325 TABLET ORAL at 14:43

## 2018-06-24 RX ADMIN — ACETAMINOPHEN 650 MG: 325 TABLET ORAL at 05:54

## 2018-06-24 RX ADMIN — OXYCODONE HYDROCHLORIDE 5 MG: 5 TABLET ORAL at 05:55

## 2018-06-24 RX ADMIN — CARVEDILOL 3.12 MG: 3.12 TABLET, FILM COATED ORAL at 17:17

## 2018-06-24 RX ADMIN — PROPOFOL 25 MCG/KG/MIN: 10 INJECTION, EMULSION INTRAVENOUS at 08:25

## 2018-06-24 RX ADMIN — LIDOCAINE HYDROCHLORIDE 40 MG: 20 INJECTION, SOLUTION EPIDURAL; INFILTRATION; INTRACAUDAL; PERINEURAL at 08:09

## 2018-06-24 RX ADMIN — HYDRALAZINE HYDROCHLORIDE 10 MG: 20 INJECTION INTRAMUSCULAR; INTRAVENOUS at 05:51

## 2018-06-24 RX ADMIN — CLONAZEPAM 0.5 MG: 1 TABLET ORAL at 23:58

## 2018-06-24 RX ADMIN — DOCUSATE SODIUM 100 MG: 100 CAPSULE, LIQUID FILLED ORAL at 17:17

## 2018-06-24 RX ADMIN — SODIUM CHLORIDE, SODIUM LACTATE, POTASSIUM CHLORIDE, AND CALCIUM CHLORIDE 75 ML/HR: 600; 310; 30; 20 INJECTION, SOLUTION INTRAVENOUS at 06:56

## 2018-06-24 RX ADMIN — QUETIAPINE FUMARATE 25 MG: 25 TABLET ORAL at 22:18

## 2018-06-24 RX ADMIN — HALOPERIDOL LACTATE 0.5 MG: 5 INJECTION, SOLUTION INTRAMUSCULAR at 17:11

## 2018-06-24 RX ADMIN — AZITHROMYCIN MONOHYDRATE 500 MG: 500 INJECTION, POWDER, LYOPHILIZED, FOR SOLUTION INTRAVENOUS at 14:45

## 2018-06-24 RX ADMIN — DOCUSATE SODIUM 100 MG: 100 CAPSULE, LIQUID FILLED ORAL at 14:43

## 2018-06-24 RX ADMIN — HYDROMORPHONE HYDROCHLORIDE 0.5 MG: 1 INJECTION, SOLUTION INTRAMUSCULAR; INTRAVENOUS; SUBCUTANEOUS at 04:39

## 2018-06-24 RX ADMIN — PROPOFOL 20 MG: 10 INJECTION, EMULSION INTRAVENOUS at 08:09

## 2018-06-24 RX ADMIN — SODIUM CHLORIDE, SODIUM LACTATE, POTASSIUM CHLORIDE, AND CALCIUM CHLORIDE 75 ML/HR: 600; 310; 30; 20 INJECTION, SOLUTION INTRAVENOUS at 18:05

## 2018-06-24 RX ADMIN — SODIUM CHLORIDE, SODIUM LACTATE, POTASSIUM CHLORIDE, AND CALCIUM CHLORIDE: 600; 310; 30; 20 INJECTION, SOLUTION INTRAVENOUS at 08:00

## 2018-06-24 RX ADMIN — Medication 2 G: at 08:11

## 2018-06-24 RX ADMIN — RANOLAZINE 500 MG: 500 TABLET, FILM COATED, EXTENDED RELEASE ORAL at 07:42

## 2018-06-24 NOTE — PROGRESS NOTES
TRANSFER - OUT REPORT:    Verbal report given to Princess Meagan RN (name) on Nadira Murphy  being transferred to Pre Op (unit) for ordered procedure       Report consisted of patients Situation, Background, Assessment and   Recommendations(SBAR). Information from the following report(s) SBAR, Kardex and MAR was reviewed with the receiving nurse. Lines:   Peripheral IV 06/23/18 Left Hand (Active)   Site Assessment Clean, dry, & intact 6/23/2018  8:24 PM   Phlebitis Assessment 0 6/23/2018  8:24 PM   Infiltration Assessment 0 6/23/2018  8:24 PM   Dressing Status Clean, dry, & intact 6/23/2018  8:24 PM   Dressing Type Transparent;Tape 6/23/2018  8:24 PM   Hub Color/Line Status Infusing 6/23/2018  8:24 PM   Action Taken Blood drawn 6/23/2018  9:42 AM       Peripheral IV 06/23/18 Right Forearm (Active)   Site Assessment Clean, dry, & intact 6/23/2018  8:24 PM   Phlebitis Assessment 0 6/23/2018  8:24 PM   Infiltration Assessment 0 6/23/2018  8:24 PM   Dressing Status Clean, dry, & intact 6/23/2018  8:24 PM   Dressing Type Transparent;Tape 6/23/2018  8:24 PM   Hub Color/Line Status Capped 6/23/2018  8:24 PM        Opportunity for questions and clarification was provided.

## 2018-06-24 NOTE — PROGRESS NOTES
Patient's family reports that pt takes clonopin \"when we feel she needs it\". They are unable to tell me exactly how often that is. The family provided the prescription bottle and verified it was 0.5 mg. Notified fracture center coordinator who allowed it to be ordered PRN. Returned the bottle to family at bedside.

## 2018-06-24 NOTE — PROGRESS NOTES
TRANSFER - IN REPORT:    Verbal report received from Lacie Rivera RN(name) on Jaime Elkins  being received from PACU(unit) for routine progression of care      Report consisted of patients Situation, Background, Assessment and   Recommendations(SBAR). Information from the following report(s) SBAR, MAR and Recent Results was reviewed with the receiving nurse. Opportunity for questions and clarification was provided. Assessment to be completed upon patients arrival to unit and care assumed.

## 2018-06-24 NOTE — BRIEF OP NOTE
BRIEF OPERATIVE NOTE    Date of Procedure: 6/24/2018   Preoperative Diagnosis: left hip fracture  Postoperative Diagnosis: Left intertrochanteric femur fracture    Procedure(s):  LEFT FEMUR INSERTION INTRA MEDULLARY NAIL  Surgeon(s) and Role:     * Bari Beckford MD - Primary         Surgical Assistant: NONE    Surgical Staff:  Circ-1: Bobby Ying RN  Scrub Tech-1: Valentino Bihari  Scrub Tech-3: Fernie Figueroa  Event Time In   Incision Start 3940   Incision Close 0908     Anesthesia: General   Estimated Blood Loss:50 CC  Specimens: * No specimens in log *   Findings: NONE   Complications: NONE  Implants:   Implant Name Type Inv.  Item Serial No.  Lot No. LRB No. Used Action   NAIL EDILIA 135D [id number] LT -- TFNA STRL - [id number]  NAIL EDILIA 135D [id number] LT -- [employer] STRL  [employer] [place] [id number] Left 1 Implanted   BLADE HELCL FEN 90MM STRL -- TFNA - [id number]   BLADE HELCL FEN 90MM STRL -- TFNA   [employer] [place] [id number] Left 1 Implanted

## 2018-06-24 NOTE — PROGRESS NOTES
Problem: Mobility Impaired (Adult and Pediatric)  Goal: *Acute Goals and Plan of Care (Insert Text)  STG:  (1.)Ms. Pablo Ling will move from supine to sit and sit to supine , scoot up and down and roll side to side with CONTACT GUARD ASSIST within 3 treatment day(s). (2.)Ms. Pablo Ling will transfer from bed to chair and chair to bed with CONTACT GUARD ASSIST using the least restrictive device within 3 treatment day(s). (3.)Ms. Pablo Ling will ambulate with MINIMAL ASSIST for 50 feet with the least restrictive device within 3 treatment day(s). LTG:  (1.)Ms. Pablo Ling will move from supine to sit and sit to supine , scoot up and down and roll side to side in bed with SUPERVISION within 7 treatment day(s). (2.)Ms. Pablo Ling will transfer from bed to chair and chair to bed with SUPERVISION using the least restrictive device within 7 treatment day(s). (3.)Ms. Pablo Ling will ambulate with STAND BY ASSIST for 150+ feet with the least restrictive device within 7 treatment day(s). ________________________________________________________________________________________________       PHYSICAL THERAPY: Initial Assessment, Treatment Day: Day of Assessment, PM 6/24/2018  INPATIENT: Hospital Day: 2  [place] / [place] / Product Type: Medicare /      NAME/AGE/GENDER: Rogelio Hanson is a 65 y.o. female   PRIMARY DIAGNOSIS: Hip fracture ([facility] [address]  left hip fracture Hip fracture (HCC) Hip fracture (HCC)  Procedure(s) (LRB):  FEMUR INSERTION INTRA MEDULLARY NAIL (Left)  Day of Surgery  ICD-10: Treatment Diagnosis:    · Generalized Muscle Weakness (M62.81)  · Difficulty in walking, Not elsewhere classified (R26.2)  · Repeated Falls (R29.6)   Precaution/Allergies:  Donepezil; Memantine; Tegaserod hydrogen maleate; Atorvastatin; Calcitonin ([place]); Colesevelam; Ezetimibe; Fluvastatin; Ibandronate sodium; Risedronate; Rosuvastatin; Rosuvastatin calcium;  Sertraline; Simvastatin; and Sulfa (sulfonamide antibiotics)      ASSESSMENT:     Ms. Heriberto Goldman presents getting back into chair with CNAs. Pt was emotional, confused, no data collected from pt, all from daughter. Pt agreed to ambulate, but began to resist ambulation and walk at same time. Once getting to doorway, with perseverance \"I can't\" several times, she was throwing RW aside, trying to sit on floor. She required influential encouragement to hold hands on RW and more influential encouragement to walk back to chair. She was able to walk back to chair and sit. Once in chair she required repositioning x2 and cont to slide in chair. Left in chair after session. Pt has fallen several times past few months including multiple injuries. Her impairments include decreased functional mobility, decreased balance, decreased strength, decreased safety awareness, increased risk for falls. Pt would benefit from further PT while in house to address these impairments to help improve to prior level of independence. Anticipate pt will require continued skilled PT following discharge from hospital due to poor balance, safety and fall risk. This section established at most recent assessment   PROBLEM LIST (Impairments causing functional limitations):  1. Decreased Strength  2. Decreased ADL/Functional Activities  3. Decreased Transfer Abilities  4. Decreased Ambulation Ability/Technique  5. Decreased Balance  6. Increased Pain  7. Decreased Activity Tolerance  8. Decreased Pacing Skills  9. Decreased Flexibility/Joint Mobility  10. Decreased Knowledge of Precautions  11. Decreased Cognition   INTERVENTIONS PLANNED: (Benefits and precautions of physical therapy have been discussed with the patient.)  1. Balance Exercise  2. Bed Mobility  3. Gait Training  4. Home Exercise Program (HEP)  5. Neuromuscular Re-education/Strengthening  6. Therapeutic Activites  7. Therapeutic Exercise/Strengthening  8.  Transfer Training     TREATMENT PLAN: Frequency/Duration: twice daily for duration of hospital stay  Rehabilitation Potential For Stated Goals: [facility] REHABILITATION/EQUIPMENT: (at time of discharge pending progress): Due to the probability of continued deficits (see above) this patient will likely need continued skilled physical therapy after discharge. Equipment:    None at this time              HISTORY:   History of Present Injury/Illness (Reason for Referral):  65 y.o. female who has a PMH of CAD, s/p 8 stents, HTN, dementia, osteoporosis, neuropathy, anxiety and multiple mechanical falls with 3 hip fractures in the past, who came after she woke up and fell while going into the living room this morning. She lives with her son, which takes care of her. Patient was found lying on the floor, unable to walk with left leg externally rotated. She denied head trauma, LOC, dizziness, chest pain, sob, abdominal pain, diarrhea. Past Medical History/Comorbidities:   Ms. Germania Lim  has a past medical history of CAD (coronary artery disease) and Hypertension. Ms. Germania Lim  has a past surgical history that includes hx hysterectomy. Social History/Living Environment:   Home Environment: Private residence  # Steps to Enter: 0  One/Two Story Residence: One story  Living Alone: No  Support Systems: Child(pascual)  Patient Expects to be Discharged to[de-identified] Private residence  Current DME Used/Available at Home: Walker, rolling, Cane, straight  Tub or Shower Type: Tub/Shower combination  Prior Level of Function/Work/Activity:  Unclear. Pt has SPC and RW, but unable to attain data on use.       Number of Personal Factors/Comorbidities that affect the Plan of Care: 1-2: MODERATE COMPLEXITY   EXAMINATION:   Most Recent Physical Functioning:   Gross Assessment:  AROM: Grossly decreased, non-functional  Strength: Generally decreased, functional  Coordination: Generally decreased, functional  Tone: Normal               Posture:     Balance:  Sitting: Impaired  Sitting - Static: Fair (occasional)  Sitting - Dynamic: Fair (occasional)  Standing: Impaired  Standing - Static: Fair  Standing - Dynamic : Poor Bed Mobility:     Wheelchair Mobility:     Transfers:  Sit to Stand: Moderate assistance  Stand to Sit: Moderate assistance  Gait:            Body Structures Involved:  1. Nerves  2. Voice/Speech  3. Bones  4. Joints  5. Muscles  6. Ligaments Body Functions Affected:  1. Mental  2. Sensory/Pain  3. Neuromusculoskeletal  4. Movement Related  5. Skin Related Activities and Participation Affected:  1. Learning and Applying Knowledge  2. General Tasks and Demands  3. Mobility  4. Self Care  5. Domestic Life  6. Community, Social and [place]   Number of elements that affect the Plan of Care: 3: MODERATE COMPLEXITY   CLINICAL PRESENTATION:   Presentation: Evolving clinical presentation with changing clinical characteristics: MODERATE COMPLEXITY   CLINICAL DECISION MAKIN [facility] Inpatient Short Form  How much difficulty does the patient currently have. .. Unable A Lot A Little None   1. Turning over in bed (including adjusting bedclothes, sheets and blankets)? [] 1   [x] 2   [] 3   [] 4   2. Sitting down on and standing up from a chair with arms ( e.g., wheelchair, bedside commode, etc.)   [] 1   [x] 2   [] 3   [] 4   3. Moving from lying on back to sitting on the side of the bed? [] 1   [x] 2   [] 3   [] 4   How much help from another person does the patient currently need. .. Total A Lot A Little None   4. Moving to and from a bed to a chair (including a wheelchair)? [] 1   [x] 2   [] 3   [] 4   5. Need to walk in hospital room? [] 1   [x] 2   [] 3   [] 4   6. Climbing 3-5 steps with a railing? [] 1   [x] 2   [] 3   [] 4   © , Trustees of [employer], under license to [employer].  All rights reserved      Score:  Initial: 12 Most Recent: X (Date: -- )    Interpretation of Tool:  Represents activities that are increasingly more difficult (i.e. Bed mobility, Transfers, Gait). Score 24 23 22-20 19-15 14-10 9-7 6     Modifier CH CI CJ CK CL CM CN      ? Mobility - Walking and Moving Around:     - CURRENT STATUS: CL - 60%-79% impaired, limited or restricted    - GOAL STATUS: CK - 40%-59% impaired, limited or restricted    - D/C STATUS:  ---------------To be determined---------------  [place] / [place] / Product Type: Medicare /      Medical Necessity:     · Skilled intervention continues to be required due to decreased function. Reason for Services/Other Comments:  · Patient continues to require skilled intervention due to medical complications and patient unable to attend/participate in therapy as expected. Use of outcome tool(s) and clinical judgement create a POC that gives a: Questionable prediction of patient's progress: MODERATE COMPLEXITY            TREATMENT:   (In addition to Assessment/Re-Assessment sessions the following treatments were rendered)   Pre-treatment Symptoms/Complaints:  none  Pain: Initial:   Pain Intensity 1: 10  Pain Location 1: Hip  Pain Orientation 1: Left  Post Session:  \"I don't know\"     Therapeutic Activity: (    8min): Therapeutic activities including Bed transfers, Chair transfers and Ambulation on level ground to improve mobility, strength, balance and coordination. Required maximal   to promote static and dynamic balance in standing, promote coordination of bilateral, lower extremity(s) and promote motor control of bilateral, lower extremity(s).          Braces/Orthotics/Lines/Etc:   · IV  · flores catheter  · O2 Device: Room air  Treatment/Session Assessment:    · Response to Treatment:  See above  · Interdisciplinary Collaboration:   o Physical Therapist  o Registered Nurse  o Certified Nursing Assistant/Patient Care Technician  · After treatment position/precautions:   o Up in chair  o Bed alarm/tab alert on  o Bed/Chair-wheels locked  o Call light within reach  o RN notified   · Compliance with Program/Exercises: Will assess as treatment progresses. · Recommendations/Intent for next treatment session: \"Next visit will focus on advancements to more challenging activities and reduction in assistance provided\".   Total Treatment Duration:  PT Patient Time In/Time Out  Time In: 1345  Time Out: Estella Abreu 53, DPT

## 2018-06-24 NOTE — ANESTHESIA PROCEDURE NOTES
Spinal Block    Start time: 6/24/2018 8:10 AM  End time: 6/24/2018 8:16 AM  Performed by: Donavan Alanis  Authorized by: Donavan Alanis     Pre-procedure:   Indications: primary anesthetic  Preanesthetic Checklist: patient identified, risks and benefits discussed, anesthesia consent, patient being monitored and timeout performed    Timeout Time: 08:10          Spinal Block:   Patient Position:  Seated  Prep Region:  Lumbar  Prep: chlorhexidine      Location:  L3-4  Technique:  Single shot  Local:  Lidocaine 1%  Local Dose (mL):  3    Needle:   Needle Type:  Pencan  Needle Gauge:  25 G  Attempts:  2      Events: CSF confirmed, no blood with aspiration and no paresthesia        Assessment:  Insertion:  Uncomplicated  Patient tolerance:  Patient tolerated the procedure well with no immediate complications  All needles out intact, procedure tolerated well without problems

## 2018-06-24 NOTE — PROGRESS NOTES
Hospitalist Progress Note     Admit Date:  2018  6:36 AM   Name:  Allie Madrid   Age:  65 y.o.  :  1940   MRN:  [id number]   PCP:  Anant Byrd MD  Treatment Team: Attending Provider: Radha Manzanares MD; Charge Nurse: Sb Balbuena; Utilization Review: [facility]    Subjective:   Pt is a 64 yo female with PMH CAD (s/p 8 stents), HTN, dementia, osteoporosis, neuropathy, anxiety, multiple mech falls. Pt has had 3 hip fractures previously. Pt woke up duing the night and got out of bed without help and fell going into the living room. Pt was found on the floor, unable to walk/bear weight. Neg for head trauma, LOC, dizziness, CP, SOB, abd pain, diarrhea. Pt saw her PCP the day before and received zithromax for bronchitis. Pt seen after surgical repair. Sitting up in bed, eating. Pt very anxious, asking to go home. Earlier she had been trying to climb out of bed repeatedly. Norris draining, urine appears somewhat concentrated.       Objective:   Patient Vitals for the past 24 hrs:   Temp Pulse Resp BP SpO2   18 1545 99.5 °F (37.5 °C) 82 18 148/73 91 %   18 1150 - 81 - (!) 139/94 -   18 1135 - 74 - 141/72 -   18 1120 - 70 - 135/72 -   18 1105 97.7 °F (36.5 °C) 71 18 144/75 96 %   18 1007 - 67 17 110/57 -   18 0959 - 71 18 110/59 -   18 0955 - 71 19 113/56 -   18 0947 97.6 °F (36.4 °C) 70 18 106/58 97 %   18 0944 - 66 21 106/55 -   18 0936 - 68 19 101/50 -   18 0931 - 70 18 100/54 -   18 0926 - 71 17 106/54 -   18 0921 - 70 18 109/53 -   18 0910 97.9 °F (36.6 °C) 72 12 129/60 97 %   18 0745 98.8 °F (37.1 °C) 76 - - 100 %   18 0701 - 78 - - 98 %   18 0700 - - - - (!) 89 %   18 0613 99.7 °F (37.6 °C) 91 18 149/67 98 %   18 0438 98 °F (36.7 °C) 94 20 177/84 97 %   18 0429 98 °F (36.7 °C) 94 20 177/84 97 %   18 0031 - - - 102/52 -   18 5544 98.1 °F (36.7 °C) 70 18 90/51 92 %   06/23/18 1940 98.7 °F (37.1 °C) 80 16 147/73 93 %     Oxygen Therapy  O2 Sat (%): 91 % (06/24/18 1545)  Pulse via Oximetry: 74 beats per minute (06/24/18 0947)  O2 Device: Room air (06/24/18 0947)  O2 Flow Rate (L/min): 4 l/min (06/24/18 0910)    Intake/Output Summary (Last 24 hours) at 06/24/18 1733  Last data filed at 06/24/18 1500   Gross per 24 hour   Intake              750 ml   Output              920 ml   Net             -170 ml         General:    Well nourished. Awake and alert. Head:  Normocephalic, atraumatic  Eyes:  Extraocular movements intact, normal sclera  CV:   RRR. No  Murmurs, clicks, or gallops  Lungs:   Unlabored, no cyanosis  Abdomen:   Soft, nondistended, nontender. Extremities: Warm and dry. No cyanosis or edema. Skin:     No rashes or jaundice. Neuro:  No gross focal deficits, Oriented x 1  Psych:  Mood and affect anxious    Data Review:  I have reviewed all labs, meds, telemetry events, and studies from the last 24 hours.     Recent Results (from the past 24 hour(s))   PTT    Collection Time: 06/24/18  4:30 AM   Result Value Ref Range    aPTT 39.1 (H) 23.2 - 35.3 SEC        All Micro Results     Procedure Component Value Units Date/Time    CULTURE, URINE [[id number]] Collected:  06/23/18 1105    Order Status:  Completed Specimen:  Urine from Norris Specimen Updated:  06/24/18 0739     Special Requests: NO SPECIAL REQUESTS        Culture result: NO GROWTH 1 DAY       MSSA/MRSA SC BY PCR, NASAL SWAB [[id number]]     Order Status:  Sent Specimen:  Swab           Current Meds:  Current Facility-Administered Medications   Medication Dose Route Frequency    lactated Ringers infusion  75 mL/hr IntraVENous CONTINUOUS    sodium chloride (NS) flush 5-10 mL  5-10 mL IntraVENous Q8H    sodium chloride (NS) flush 5-10 mL  5-10 mL IntraVENous PRN    ceFAZolin (ANCEF) 1 g in sterile water (preservative free) 10 mL IV syringe  1 g IntraVENous Q8H    acetaminophen (TYLENOL) tablet 650 mg  650 mg Oral Q8H    oxyCODONE IR (ROXICODONE) tablet 5 mg  5 mg Oral Q4H PRN    ondansetron (ZOFRAN) injection 4 mg  4 mg IntraVENous Q4H PRN    docusate sodium (COLACE) capsule 100 mg  100 mg Oral BID    alum-mag hydroxide-simeth (MYLANTA) oral suspension 30 mL  30 mL Oral Q4H PRN    calcium-vitamin D (OS-ABBE) 500 mg-200 unit tablet  1 Tab Oral TID WITH MEALS    [START ON 6/25/2018] enoxaparin (LOVENOX) injection 30 mg  30 mg SubCUTAneous Q24H    haloperidol lactate (HALDOL) injection 0.5 mg  0.5 mg IntraMUSCular Q6H PRN    0.9% sodium chloride infusion 2,000 mL  2,000 mL IntraVENous CONTINUOUS    traMADol (ULTRAM) tablet 50 mg  50 mg Oral Q6H PRN    hydrALAZINE (APRESOLINE) 20 mg/mL injection 10 mg  10 mg IntraVENous Q6H PRN    naloxone (NARCAN) injection 0.4 mg  0.4 mg IntraVENous PRN    carvedilol (COREG) tablet 3.125 mg  3.125 mg Oral BID WITH MEALS    QUEtiapine (SEROquel) tablet 25 mg  25 mg Oral QHS    ranolazine ER (RANEXA) tablet 500 mg  500 mg Oral BID    azithromycin (ZITHROMAX) 500 mg in 0.9% sodium chloride (MBP/ADV) 250 mL  500 mg IntraVENous Q24H       Diet:  DIET REGULAR    Other Studies (last 24 hours):  Xr Femur Lt 2 V    Result Date: 6/24/2018  6 VIEW PORTABLE LEFT FEMUR: CLINICAL HISTORY:  Intraoperative evaluation. TECHNIQUE:  Examination was monitored in the OR by Dr. Marjorie Waller. Fluoroscopy time was 3.4 minutes with 6 spot images presented. COMPARISON:  June 23, 2018. FINDINGS:  These images are presented for review at 1103 hours. They demonstrate placement of an intramedullary kendra and compression screw transfixing the basicervical fracture. Principal fragments are in adequate alignment. Fine bony detail is not rendered on these portable images. IMPRESSION:  BASICERVICAL FRACTURE STATUS POST ORIF.       Assessment and Plan:     Hospital Problems as of 6/24/2018  Date Reviewed: 6/23/2018          Codes Class Noted - Resolved POA    * (Principal)Hip fracture [facility]) ICD-10-CM: S72.009A  ICD-9-CM: 820.8  6/23/2018 - Present Unknown        HTN (hypertension) ICD-10-CM: I10  ICD-9-CM: 401.9  6/23/2018 - Present Unknown        Dementia ICD-10-CM: F03.90  ICD-9-CM: 294.20  6/23/2018 - Present Unknown        CAD (coronary artery disease) ICD-10-CM: I25.10  ICD-9-CM: 414.00  6/23/2018 - Present Unknown        Dyslipidemia ICD-10-CM: E78.5  ICD-9-CM: 272.4  6/23/2018 - Present Unknown        Neuropathy ICD-10-CM: G62.9  ICD-9-CM: 355.9  6/23/2018 - Present Unknown        Anxiety ICD-10-CM: F41.9  ICD-9-CM: 300.00  6/23/2018 - Present Unknown        Back pain ICD-10-CM: M54.9  ICD-9-CM: 724.5  6/23/2018 - Present Unknown        Osteoporosis ICD-10-CM: M81.0  ICD-9-CM: 733.00  6/23/2018 - Present Unknown        History of pelvic fracture ICD-10-CM: Z87.81  ICD-9-CM: V15.51  6/23/2018 - Present Unknown        Bronchitis ICD-10-CM: J40  ICD-9-CM: 491  6/23/2018 - Present Yes        Hypoxia ICD-10-CM: R09.02  ICD-9-CM: 799.02  6/23/2018 - Present Yes              A/P:    1. Left hip fracture after a mechanical fall  IVF and pain meds per ortho  PT/OT  PPD  CM consult  Jr    2. History of recent bronchitis, started on zithromax yesterday by her outside MD Tee  Cont zithromax  Oxygen wean as appropriate    3. Acute hypoxic respiratory failure, possible related to her underlying bronchitis, opiate use  Oxygen, wean as appropriate    4. CAD, no chest pain today  Resume home meds  EKG    5.HTN, out of control, possible due to pain  Resume home meds  Monitor  Hydralazine prn    6. Dementia   Close supervision  Haldol per ortho order    DC planning/Dispo:  STR  DVT ppx:  SCDs    Case reviewed with supervising MD - G.  Aniyah Worthington MD    Signed:  MUKESH Luna

## 2018-06-24 NOTE — ANESTHESIA POSTPROCEDURE EVALUATION
Post-Anesthesia Evaluation and Assessment    Patient: Hao Dunbar MRN: [id number]  SSN: [id number]    YOB: 1940  Age: 65 y.o. Sex: female       Cardiovascular Function/Vital Signs  Visit Vitals    BP (P) 110/59    Pulse (P) 71    Temp 36.4 °C (97.6 °F)    Resp (P) 18    Ht 5' 6\" (1.676 m)    Wt 42.2 kg (93 lb)    SpO2 97%    BMI 15.01 kg/m2       Patient is status post spinal anesthesia for Procedure(s): FEMUR INSERTION INTRA MEDULLARY NAIL. Nausea/Vomiting: None    Postoperative hydration reviewed and adequate. Pain:  Pain Scale 1: Numeric (0 - 10) (06/24/18 0947)  Pain Intensity 1: 0 (06/24/18 0947)   Managed    Neurological Status:   Neuro (WDL): Exceptions to WDL (06/24/18 0947)  Neuro  Neurologic State: Alert (06/24/18 0947)  Orientation Level: Oriented X4 (06/24/18 0947)  Cognition: Follows commands (06/24/18 0947)  Speech: Clear (06/24/18 0947)  LUE Motor Response: Purposeful (06/24/18 0947)  LLE Motor Response: Pharmacologically paralyzed (06/24/18 0947)  RUE Motor Response: Purposeful (06/24/18 0947)  RLE Motor Response: Pharmacologically paralyzed (06/24/18 0947)   At baseline    Mental Status and Level of Consciousness: Arousable    Pulmonary Status:   O2 Device: Room air (06/24/18 0947)   Adequate oxygenation and airway patent    Complications related to anesthesia: None    Post-anesthesia assessment completed. No concerns. Good result with spinal anesthesia, resolving normally.     Signed By: Romayne Graver, MD     June 24, 2018

## 2018-06-24 NOTE — PERIOP NOTES
TRANSFER - IN REPORT:    Verbal report received from Arlo Blizzard, RN on Pauline Dennis  being received from 726(unit) for routine progression of care      Report consisted of patients Situation, Background, Assessment and   Recommendations(SBAR). Information from the following report(s) SBAR was reviewed with the receiving nurse. Opportunity for questions and clarification was provided. Assessment completed upon patients arrival to unit and care assumed.

## 2018-06-24 NOTE — PROGRESS NOTES
1. Patient will verbalize and demonstrate understanding of hip precautions with 100% accuracy during ADL. 2. Patient will complete functional transfers with CGA and adaptive equipment as needed. 3. Patient will complete lower body bathing and dressing with min A and adaptive equipment as needed. 4. Patient will complete toileting with min A.   5. Patient will tolerate at least 10 minutes of BUE therapeutic exercises to increase strength in BUE to aid in functional transfers. 6. Patient will tolerate at least 23 minutes of OT treatment with no rest breaks to increase activity tolerance for ADLs. Timeframe: 7 visits       OCCUPATIONAL THERAPY: Initial Assessment and PM 6/24/2018  INPATIENT: Hospital Day: 2  [place] / Product Type: Medicare /      NAME/AGE/GENDER: Hao Dunbar is a 65 y.o. female   PRIMARY DIAGNOSIS:  Hip fracture ([address]  left hip fracture Hip fracture (HCC) Hip fracture (HCC)  Procedure(s) (LRB):  FEMUR INSERTION INTRA MEDULLARY NAIL (Left)  Day of Surgery  ICD-10: Treatment Diagnosis:    · Generalized Muscle Weakness (M62.81)  · History of falling (Z91.81)   Precautions/Allergies:     Donepezil; Memantine; Tegaserod hydrogen maleate; Atorvastatin; Calcitonin (salmon); Colesevelam; Ezetimibe; Fluvastatin; Ibandronate sodium; Risedronate; Rosuvastatin; Rosuvastatin calcium; Sertraline; Simvastatin; and Sulfa (sulfonamide antibiotics)      ASSESSMENT:     Ms. Rossy Kovacs presents for the above. Upon arrival; pt supine in bed eating lunch with daughter in room. Pt reports no pain while lying down. Pt is alert, however only oriented to person; daughter reports this is somewhat baseline for pt. Pt and pt's daughter report completing ADLs and functional mobility independently at baseline. Pt lives with her daughter in 1 story home with 0 steps to enter and access to tub-shower combo. Pt is currently WBAT in L LE.  Pt able to complete supine to sit transfer to edge of bed with min A today. Pt able to stand with min A however require mod verbal cueing and hand over hand assist to place hands on RW. Pt able to take ~ 5 steps to bedside chair with min A. Pt left sitting upright with legs reclined, posey alarm on, needs met, and daughter at bedside. At this time, pt is functioning below baseline for ADLs and functional mobility. Pt would benefit from skilled OT services to address OT goals and plan of care. This section established at most recent assessment   PROBLEM LIST (Impairments causing functional limitations):  1. Decreased Strength  2. Decreased ADL/Functional Activities  3. Decreased Transfer Abilities  4. Decreased Ambulation Ability/Technique  5. Decreased Balance  6. Increased Pain  7. Decreased Activity Tolerance   INTERVENTIONS PLANNED: (Benefits and precautions of occupational therapy have been discussed with the patient.)  1. Activities of daily living training  2. Adaptive equipment training  3. Balance training  4. Clothing management  5. Community reintergration  6. Donning&doffing training  7. Group therapy  8. Manual therapy training  9. Re-evaluation  10. Therapeutic activity  11. Therapeutic exercise     TREATMENT PLAN: Frequency/Duration: Follow patient 6x/week to address above goals. Rehabilitation Potential For Stated Goals: Good     RECOMMENDED REHABILITATION/EQUIPMENT: (at time of discharge pending progress): Due to the probability of continued deficits (see above) this patient will likely need continued skilled occupational therapy after discharge. Equipment:    TBD              OCCUPATIONAL PROFILE AND HISTORY:   History of Present Injury/Illness (Reason for Referral):  See h&P  Past Medical History/Comorbidities:   Ms. Donnie Russo  has a past medical history of CAD (coronary artery disease) and Hypertension. Ms. Donnie Russo  has a past surgical history that includes hx hysterectomy.   Social History/Living Environment:   Home Environment: Private residence  # Steps to Enter: 0  One/Two Story Residence: One story  Living Alone: No  Support Systems: Child(pascual)  Patient Expects to be Discharged to[de-identified] Private residence  Current DME Used/Available at Home: John Andreea, straight, Shower chair, Walker  Tub or Shower Type: Tub/Shower combination  Prior Level of Function/Work/Activity:  Independent in ADLs and fx mobility. Personal Factors:          Sex:  female        Age:  65 y.o. Past/Current Experience:  Hx of falls         Other factors that influence how disability is experienced by the patient:  Multiple co-morbidities    Number of Personal Factors/Comorbidities that affect the Plan of Care: Expanded review of therapy/medical records (1-2):  MODERATE COMPLEXITY   ASSESSMENT OF OCCUPATIONAL PERFORMANCE[de-identified]   Activities of Daily Living:   Basic ADLs (From Assessment) Complex ADLs (From Assessment)   Feeding: Independent  Oral Facial Hygiene/Grooming: Independent  Bathing: Maximum assistance  Upper Body Dressing: Minimum assistance  Lower Body Dressing: Maximum assistance  Toileting: Maximum assistance Instrumental ADL  Meal Preparation: Total assistance  Homemaking: Total assistance   Grooming/Bathing/Dressing Activities of Daily Living     Cognitive Retraining  Safety/Judgement: Decreased awareness of environment; Fall prevention;Home safety                       Bed/Mat Mobility  Supine to Sit: Minimum assistance  Sit to Stand: Minimum assistance  Bed to Chair: Minimum assistance  Scooting: Minimum assistance       Most Recent Physical Functioning:   Gross Assessment:                  Posture:     Balance:  Sitting: Impaired  Sitting - Static: Good (unsupported)  Sitting - Dynamic: Fair (occasional)  Standing: Impaired  Standing - Static: Fair  Standing - Dynamic : Poor Bed Mobility:  Supine to Sit: Minimum assistance  Scooting: Minimum assistance  Wheelchair Mobility:     Transfers:  Sit to Stand: Minimum assistance  Stand to Sit: Minimum assistance  Bed to Chair: Minimum assistance            Patient Vitals for the past 6 hrs:   BP BP Patient Position SpO2 O2 Flow Rate (L/min) Pulse   18 0745 - - 100 % 2 l/min 76   18 0910 129/60 At rest 97 % 4 l/min 72   18 0921 109/53 - - - 70   18 0926 106/54 - - - 71   18 0931 100/54 - - - 70   18 0936 101/50 - - - 68   18 0944 106/55 - - - 66   18 0947 106/58 At rest 97 % - 70   18 0955 113/56 - - - 71   18 0959 110/59 - - - 71   18 1007 110/57 - - - 67   18 1105 144/75 At rest 96 % - 71   18 1120 135/72 - - - 70   18 1135 141/72 - - - 74   18 1150 (!) 139/94 - - - 81       Mental Status  Neurologic State: Alert  Orientation Level: Oriented to person, Disoriented to time, Disoriented to situation, Disoriented to place  Cognition: Follows commands  Perception: Appears intact  Perseveration: No perseveration noted  Safety/Judgement: Decreased awareness of environment, Fall prevention, Home safety                          Physical Skills Involved:  1. Range of Motion  2. Balance  3. Strength  4. Activity Tolerance  5. Gross Motor Control  6. Pain (acute) Cognitive Skills Affected (resulting in the inability to perform in a timely and safe manner):  1. Executive Function  2. Immediate Memory  3. Short Term Recall  4. Long Term Memory Psychosocial Skills Affected:  1. Habits/Routines  2. Environmental Adaptation   Number of elements that affect the Plan of Care: 5+:  HIGH COMPLEXITY   CLINICAL DECISION MAKIN [facility] [address] [address] AM-PAC 6 Clicks   Daily Activity Inpatient Short Form  How much help from another person does the patient currently need. .. Total A Lot A Little None   1. Putting on and taking off regular lower body clothing? [] 1   [x] 2   [] 3   [] 4   2. Bathing (including washing, rinsing, drying)? [] 1   [x] 2   [] 3   [] 4   3.   Toileting, which includes using toilet, bedpan or urinal?   [] 1   [x] 2 [] 3   [] 4   4. Putting on and taking off regular upper body clothing? [] 1   [] 2   [x] 3   [] 4   5. Taking care of personal grooming such as brushing teeth? [] 1   [] 2   [] 3   [x] 4   6. Eating meals? [] 1   [] 2   [] 3   [x] 4   © 2007, Trustees of [address] [place] [address] [address] [address], under license to [employer]. All rights reserved      Score:  Initial: 17 Most Recent: X (Date: -- )    Interpretation of Tool:  Represents activities that are increasingly more difficult (i.e. Bed mobility, Transfers, Gait). Score 24 23 22-20 19-15 14-10 9-7 6     Modifier CH CI CJ CK CL CM CN      ? Self Care:     - CURRENT STATUS: CK - 40%-59% impaired, limited or restricted    - GOAL STATUS: CJ - 20%-39% impaired, limited or restricted    - D/C STATUS:  ---------------To be determined---------------  [place] / Product Type: Medicare /      Medical Necessity:     · Patient is expected to demonstrate progress in strength, range of motion, balance, coordination and functional technique to increase independence with ADLs and functional mobility. Reason for Services/Other Comments:  · Patient continues to require skilled intervention due to medical complications and patient unable to attend/participate in therapy as expected. Use of outcome tool(s) and clinical judgement create a POC that gives a: MODERATE COMPLEXITY         TREATMENT:   (In addition to Assessment/Re-Assessment sessions the following treatments were rendered)     Pre-treatment Symptoms/Complaints:    Pain: Initial:   Pain Intensity 1: 1  Pain Location 1: Hip  Pain Orientation 1: Left  Pain Intervention(s) 1: Ambulation/Increased Activity  Post Session:  7/10     Assessment/Reassessment only, no treatment provided today    Braces/Orthotics/Lines/Etc:   · IV  · flores catheter  · O2 Device: Room air  Treatment/Session Assessment:    · Response to Treatment:  Good. eval only.    · Interdisciplinary Collaboration:   o Occupational Therapist  o Registered Nurse  · After treatment position/precautions:   o Up in chair  o Bed alarm/tab alert on  o Bed/Chair-wheels locked  o Call light within reach  o RN notified  o Family at bedside   · Compliance with Program/Exercises: Will assess as treatment progresses. · Recommendations/Intent for next treatment session: \"Next visit will focus on advancements to more challenging activities and reduction in assistance provided\".   Total Treatment Duration:  OT Patient Time In/Time Out  Time In: 1213  Time Out: [facility] Paul Gamble

## 2018-06-24 NOTE — OP NOTES
Operative Report     Patient: Rogelio Hanson MRN: [id number]  SSN: [id number]    YOB: 1940  Age: 65 y.o. Sex: female      Indications: Rogelio Hanson was admitted to the hospital with a brief history of left intertrochanteric hip fracture. The patient now presents for orif. Date of Surgery: 6/24/2018     Preoperative Diagnosis:  left hip fracture    Postoperative Diagnosis: Left intertrochanteric femur fracture    Surgeon(s) and Role:     * Sherie Lynn MD - Primary     Anesthesia: General    Procedures: Procedure(s):  Left FEMUR INSERTION INTRA MEDULLARY NAIL       Procedure in Detail:  The patient was brought to the operative suite and placed in supine position. After adequate anesthesia was achieved, the patient was placed upon the fracture table. Peroneal post and foot holders were well padded. The patient underwent a closed reduction of the left intertrochanteric hip fracture. This was achieved by longitudinal traction, internal rotation, and adduction. AP and lateral fluoroscopic images confirmed the fracture was now reduced anatomically. left hip was then prepped and draped in the usual sterile fashion. A 3 cm incision was made over the tip of the greater trochanter. Hemostasis was achieved with Bovie cautery. Guide pin for a Synthes trochanteric fixation nail was inserted through the tip of the trochanter, across the fracture site, and into the canal of the femur. Its position was confirmed by fluoroscopy. The proximal reamer was then passed by hand over this guide pin in order to open up a proximal portal.  Guide pin and reamer were removed a ball-tip guide wire was inserted through this portal, across the fracture site, and down to the epiphyseal scar of the knee. The position of the guide wire was confirmed by AP and lateral fluoroscopic images. The 11.5 mm reamer was passed as a sound, and reaming was only performed by power if necessary.   The Synthes trochanteric fixation nail was then passed over the guide wire without difficulty. The guide wire was removed and the targeting guide was inserted through a stab wound in the lateral aspect of the proximal femur. Guide pin was then inserted through the lateral cortex into the neck and head. It stopped just short of the subchondral bone. AP and lateral fluoroscopic images confirmed that the position of the guide pin was centered in the head. Depth gauge was used to determine the appropriate length helical blade. The reamers were passed over the guide pin in order to open up the lateral cortex neck and head. The appropriate length helical blade was then passed over the guide wire without difficulty. The set screw was passed from above with a spring wrench. Traction was removed. The position of the helical blade was confirmed by fluoroscopy. The fracture was then compressed to a stable position, using the proximal targeting guide. At this point, the targeting guides were removed. AP and lateral fluoroscopic images confirmed the fracture was reduced anatomically. Wounds were then irrigated with normal saline and closed in layers. Sterile, compressive dressings were applied. The patient was then removed from the fracture table and transferred to the postanesthesia care unit, alert and oriented, under the care of anesthesia. Estimated Blood Loss: 50 cc    Specimens: * No specimens in log *      Implants:   Implant Name Type Inv.  Item Serial No.  Lot No. LRB No. Used Action   NAIL EDILIA 135D [id number] LT -- TFNA STRL - [id number]  NAIL EDILIA 135D [id number] LT -- [employer] STRL  [employer] [place] [id number] Left 1 Implanted   BLADE HELCL FEN 90MM STRL -- TFNA - [id number]   BLADE HELCL FEN 90MM STRL -- TFNA   [employer] [place] [id number] Left 1 Implanted       Tourniquet Time: * No tourniquets in log *     Closure: Primary    Complications: None     Signed By: Scott Navas MD     June 24, 2018

## 2018-06-25 LAB
25(OH)D3+25(OH)D2 SERPL-MCNC: 41.4 NG/ML (ref 30–100)
BACTERIA SPEC CULT: ABNORMAL
BACTERIA SPEC CULT: ABNORMAL
BACTERIA SPEC CULT: NORMAL
BASOPHILS # BLD: 0 K/UL (ref 0–0.2)
BASOPHILS NFR BLD: 0 % (ref 0–2)
DIFFERENTIAL METHOD BLD: ABNORMAL
EOSINOPHIL # BLD: 0.1 K/UL (ref 0–0.8)
EOSINOPHIL NFR BLD: 1 % (ref 0.5–7.8)
ERYTHROCYTE [DISTWIDTH] IN BLOOD BY AUTOMATED COUNT: 13.7 % (ref 11.9–14.6)
HCT VFR BLD AUTO: 29.5 % (ref 35.8–46.3)
HGB BLD-MCNC: 9.9 G/DL (ref 11.7–15.4)
IMM GRANULOCYTES # BLD: 0 K/UL (ref 0–0.5)
IMM GRANULOCYTES NFR BLD AUTO: 0 % (ref 0–5)
LYMPHOCYTES # BLD: 1.1 K/UL (ref 0.5–4.6)
LYMPHOCYTES NFR BLD: 15 % (ref 13–44)
MCH RBC QN AUTO: 30.8 PG (ref 26.1–32.9)
MCHC RBC AUTO-ENTMCNC: 33.6 G/DL (ref 31.4–35)
MCV RBC AUTO: 91.9 FL (ref 79.6–97.8)
MM INDURATION POC: 0 MM (ref 0–5)
MONOCYTES # BLD: 0.6 K/UL (ref 0.1–1.3)
MONOCYTES NFR BLD: 8 % (ref 4–12)
NEUTS SEG # BLD: 5.6 K/UL (ref 1.7–8.2)
NEUTS SEG NFR BLD: 76 % (ref 43–78)
PLATELET # BLD AUTO: 139 K/UL (ref 150–450)
PMV BLD AUTO: 9.5 FL (ref 10.8–14.1)
PPD POC: NORMAL NEGATIVE
RBC # BLD AUTO: 3.21 M/UL (ref 4.05–5.25)
SERVICE CMNT-IMP: ABNORMAL
SERVICE CMNT-IMP: NORMAL
WBC # BLD AUTO: 7.4 K/UL (ref 4.3–11.1)

## 2018-06-25 PROCEDURE — 65270000029 HC RM PRIVATE

## 2018-06-25 PROCEDURE — 74011250637 HC RX REV CODE- 250/637: Performed by: NURSE PRACTITIONER

## 2018-06-25 PROCEDURE — 92610 EVALUATE SWALLOWING FUNCTION: CPT

## 2018-06-25 PROCEDURE — BT40ZZZ ULTRASONOGRAPHY OF BLADDER: ICD-10-PCS | Performed by: INTERNAL MEDICINE

## 2018-06-25 PROCEDURE — 85025 COMPLETE CBC W/AUTO DIFF WBC: CPT | Performed by: NURSE PRACTITIONER

## 2018-06-25 PROCEDURE — 97530 THERAPEUTIC ACTIVITIES: CPT

## 2018-06-25 PROCEDURE — 74011000250 HC RX REV CODE- 250: Performed by: NURSE PRACTITIONER

## 2018-06-25 PROCEDURE — 36415 COLL VENOUS BLD VENIPUNCTURE: CPT | Performed by: NURSE PRACTITIONER

## 2018-06-25 PROCEDURE — 74011250637 HC RX REV CODE- 250/637: Performed by: INTERNAL MEDICINE

## 2018-06-25 PROCEDURE — 77030020255 HC SOL INJ LR 1000ML BG

## 2018-06-25 PROCEDURE — 74011250636 HC RX REV CODE- 250/636: Performed by: ORTHOPAEDIC SURGERY

## 2018-06-25 PROCEDURE — 74011250636 HC RX REV CODE- 250/636: Performed by: NURSE PRACTITIONER

## 2018-06-25 PROCEDURE — 97116 GAIT TRAINING THERAPY: CPT

## 2018-06-25 PROCEDURE — 51798 US URINE CAPACITY MEASURE: CPT

## 2018-06-25 RX ORDER — AZITHROMYCIN 250 MG/1
500 TABLET, FILM COATED ORAL ONCE
Status: COMPLETED | OUTPATIENT
Start: 2018-06-25 | End: 2018-06-25

## 2018-06-25 RX ORDER — ALBUTEROL SULFATE 0.83 MG/ML
2.5 SOLUTION RESPIRATORY (INHALATION)
Status: DISCONTINUED | OUTPATIENT
Start: 2018-06-25 | End: 2018-06-26 | Stop reason: HOSPADM

## 2018-06-25 RX ADMIN — CALCIUM CARBONATE 500 MG (1,250 MG)-VITAMIN D3 200 UNIT TABLET 1 TABLET: at 11:00

## 2018-06-25 RX ADMIN — HALOPERIDOL LACTATE 0.5 MG: 5 INJECTION, SOLUTION INTRAMUSCULAR at 19:58

## 2018-06-25 RX ADMIN — RANOLAZINE 500 MG: 500 TABLET, FILM COATED, EXTENDED RELEASE ORAL at 08:41

## 2018-06-25 RX ADMIN — Medication 1 AMPULE: at 08:41

## 2018-06-25 RX ADMIN — TRAMADOL HYDROCHLORIDE 50 MG: 50 TABLET, FILM COATED ORAL at 05:15

## 2018-06-25 RX ADMIN — ACETAMINOPHEN 650 MG: 325 TABLET ORAL at 14:00

## 2018-06-25 RX ADMIN — CARVEDILOL 3.12 MG: 3.12 TABLET, FILM COATED ORAL at 08:42

## 2018-06-25 RX ADMIN — CALCIUM CARBONATE 500 MG (1,250 MG)-VITAMIN D3 200 UNIT TABLET 1 TABLET: at 08:41

## 2018-06-25 RX ADMIN — SODIUM CHLORIDE, SODIUM LACTATE, POTASSIUM CHLORIDE, AND CALCIUM CHLORIDE 75 ML/HR: 600; 310; 30; 20 INJECTION, SOLUTION INTRAVENOUS at 05:16

## 2018-06-25 RX ADMIN — ACETAMINOPHEN 325 MG: 650 SUPPOSITORY RECTAL at 23:50

## 2018-06-25 RX ADMIN — ACETAMINOPHEN 650 MG: 325 TABLET ORAL at 05:15

## 2018-06-25 RX ADMIN — ACETAMINOPHEN 325 MG: 650 SUPPOSITORY RECTAL at 18:37

## 2018-06-25 RX ADMIN — WATER 1 G: 1 INJECTION INTRAMUSCULAR; INTRAVENOUS; SUBCUTANEOUS at 00:11

## 2018-06-25 RX ADMIN — DOCUSATE SODIUM 100 MG: 100 CAPSULE, LIQUID FILLED ORAL at 08:41

## 2018-06-25 RX ADMIN — ENOXAPARIN SODIUM 30 MG: 100 INJECTION SUBCUTANEOUS at 08:41

## 2018-06-25 RX ADMIN — AZITHROMYCIN 500 MG: 250 TABLET, FILM COATED ORAL at 10:59

## 2018-06-25 RX ADMIN — Medication 1 AMPULE: at 20:02

## 2018-06-25 NOTE — PROGRESS NOTES
STG: Pt will participate with modified barium swallow study x1  LTG: Pt will tolerate the least restrictive diet at discharge without respiratory compromise    Speech language pathology: bedside swallow note: Initial Assessment    NAME/AGE/GENDER: Lisa Cordoab is a 65 y.o. female  DATE: 6/25/2018  PRIMARY DIAGNOSIS: Hip fracture (HCC)  left hip fracture  Procedure(s) (LRB):  FEMUR INSERTION INTRA MEDULLARY NAIL (Left) 1 Day Post-Op  ICD-10: Treatment Diagnosis: dysphagia; oropharyngeal R13.12  INTERDISCIPLINARY COLLABORATION: Registered Nurse and MD  PRECAUTIONS/ALLERGIES: Donepezil; Memantine; Tegaserod hydrogen maleate; Atorvastatin; Calcitonin (salmon); Colesevelam; Ezetimibe; Fluvastatin; Ibandronate sodium; Risedronate; Rosuvastatin; Rosuvastatin calcium; Sertraline; Simvastatin; and Sulfa (sulfonamide antibiotics) ASSESSMENT:Patient s/p left hip fracture. Sitting up in the chair. Per RN and family members, patient became wet and gurgly after eating/drinking earlier today and was made NPO. Family reports occasional cough when drinking at home. Strong cough with third tsp trial of water this session. Attempted nectar thick liquids and honey thick liquids with delayed coughing episodes after the swallow. Patient coughed back up a portion of the thickened liquids. No initial signs/sx of aspiration with puree or a mechanical soft but then gagged and dry heaved after final trial of mechanical soft with no further trials provided. Patient has bronchitis but prior to po trials and for a while after trials involving discussion with family and MD patient demonstrated no baseline coughing. Ortho indicating that further pursuing swallowing assessment not necessary but family would like to proceed with modified barium swallow study (MBS) discussed prior to MD arrival and asked that this be ordered prior to discharge to rehabilitation facility.   We discussed that what they decide to do with the objective information the study will provide is up to them as far as following a modified diet but they would like an objective assessment and are hesitant for the patient to eat/drink until this is provided given episode earlier today. Recommend NPO with modified barium swallow study (MBS). Patient will benefit from skilled intervention to address the below impairments. ?????? ? ? This section established at most recent assessment??????????  PROBLEM LIST (Impairments causing functional limitations):  1. dysphagia  REHABILITATION POTENTIAL FOR STATED GOALS: Good  PLAN OF CARE:   Patient will benefit from skilled intervention to address the following impairments. RECOMMENDATIONS AND PLANNED INTERVENTIONS (Benefits and precautions of therapy have been discussed with the patient.):  · NPO except meds in puree  MEDICATIONS:  · Crushed in puree  COMPENSATORY STRATEGIES/MODIFICATIONS INCLUDING:  · Small sips and bites  OTHER RECOMMENDATIONS (including follow up treatment recommendations): · Family training/education  · Patient education  · modified barium swallow study  RECOMMENDED DIET MODIFICATIONS DISCUSSED WITH:  · Hospitalist  · Medical Sub-Specialist  · Nursing  · Family  · Patient  FREQUENCY/DURATION: Continue to follow patient 3 times a week for duration of hospital stay to address above goals. RECOMMENDED REHABILITATION/EQUIPMENT: (at time of discharge pending progress): Due to the probability of continued deficits (see above) this patient will likely need continued skilled speech therapy after discharge. SUBJECTIVE:   Confused. History of Present Injury/Illness: Ms. Elio Hurley  has a past medical history of CAD (coronary artery disease) and Hypertension. .She also  has a past surgical history that includes hx hysterectomy.    Present Symptoms: cough  Pain Intensity 1: 0  Pain Location 1: Hip  Pain Orientation 1: Left  Pain Intervention(s) 1: Medication (see MAR)  Current Medications:   No current facility-administered medications on file prior to encounter. No current outpatient prescriptions on file prior to encounter. Current Dietary Status:  NPO        Social History/Home Situation: home with family  Home Environment: Private residence  # Steps to Enter: 0  One/Two Story Residence: One story  Living Alone: No  Support Systems: Child(pascual)  Patient Expects to be Discharged to[de-identified] Private residence  Current DME Used/Available at Home: Lubertha Evener, rolling, Benjy Oris, straight  Tub or Shower Type: Tub/Shower combination  OBJECTIVE:   Respiratory Status:        CXR Results:n/a  MRI/CT Results:n/a  Oral Motor Structure/Speech:  Oral-Motor Structure/Motor Speech  Labial: No impairment  Dentition: Upper dentures, Natural  Lingual: No impairment    Cognitive and Communication Status:  Neurologic State: Confused  Orientation Level: Oriented to person  Cognition: Memory loss  Perception: Appears intact  Perseveration: No perseveration noted  Safety/Judgement: Decreased insight into deficits    BEDSIDE SWALLOW EVALUATION  Oral Assessment:  Oral Assessment  Labial: No impairment  Dentition: Upper dentures; Natural  Lingual: No impairment  P.O. Trials:  Patient Position: upright in chair    The patient was given tsp to straw amounts of the following:   Consistency Presented: Thin liquid;Pudding;Mixed consistency; Nectar thick liquid;Honey thick liquid  How Presented: Self-fed/presented;Straw;Cup/sip;Spoon    ORAL PHASE:  Bolus Acceptance: No impairment  Bolus Formation/Control: Impaired  Propulsion: Delayed (# of seconds)  Type of Impairment: Delayed;Mastication  Oral Residue: None    PHARYNGEAL PHASE:  Initiation of Swallow: No impairment  Laryngeal Elevation: Functional  Aspiration Signs/Symptoms: Delayed cough/throat clear;Strong cough              Pharyngeal Phase Characteristics: Poor endurance; Audible swallow    OTHER OBSERVATIONS:  Rate/bite size: WNL   Endurance:  Questionable     Tool Used: Dysphagia Outcome and Severity Scale (MARY) Score Comments   Normal Diet  [] 7 With no strategies or extra time needed   Functional Swallow  [] 6 May have mild oral or pharyngeal delay       Mild Dysphagia    [] 5 Which may require one diet consistency restricted (those who demonstrate penetration which is entirely cleared on MBS would be included)   Mild-Moderate Dysphagia  [] 4 With 1-2 diet consistencies restricted       Moderate Dysphagia  [] 3 With 2 or more diet consistencies restricted       Moderately Severe Dysphagia  [x] 2 With partial PO strategies (trials with ST only)       Severe Dysphagia  [] 1 With inability to tolerate any PO safely          Score:  Initial: 2 Most Recent: X (Date: -- )   Interpretation of Tool: The Dysphagia Outcome and Severity Scale (MARY) is a simple, easy-to-use, 7-point scale developed to systematically rate the functional severity of dysphagia based on objective assessment and make recommendations for diet level, independence level, and type of nutrition. Score 7 6 5 4 3 2 1   Modifier CH CI CJ CK CL CM CN   ?  Swallowing:     - CURRENT STATUS: CM - 80%-99% impaired, limited or restricted    - GOAL STATUS:  CK - 40%-59% impaired, limited or restricted    - D/C STATUS:  ---------------To be determined---------------  [place] / Product Type: Medicare /     TREATMENT:    (In addition to Assessment/Re-Assessment sessions the following treatments were rendered)  Assessment/Reassessment only, no treatment provided today  MODALITIES:                                                                    ORAL MOTOR  EXERCISES:                                                                                                                                                                      LARYNGEAL / PHARYNGEAL EXERCISES: __________________________________________________________________________________________________  Safety:   After treatment position/precautions:  · RN notified  · Family at bedside  · Upright in Bed  Progression/Medical Necessity:   · Skilled intervention continues to be required due to persistent signs and symptoms of aspiration and unable to attend/participate in therapy as expected. Compliance with Program/Exercises: Will assess as treatment progresses. Reason for Continuation of Services/Other Comments:  · Patient continues to require skilled intervention due to patient unable to attend/participate in therapy as expected. Recommendations/Intent for next treatment session: \"Treatment next visit will focus on modified barium swallow study\".     Total Treatment Duration:  Time In: 1600  Time Out: 2200 [facility] MS, CCC-SLP

## 2018-06-25 NOTE — PROGRESS NOTES
Problem: Mobility Impaired (Adult and Pediatric)  Goal: *Acute Goals and Plan of Care (Insert Text)  STG:  (1.)Ms. Constantine Ramirez will move from supine to sit and sit to supine , scoot up and down and roll side to side with CONTACT GUARD ASSIST within 3 treatment day(s). (2.)Ms. Constantine Ramirez will transfer from bed to chair and chair to bed with CONTACT GUARD ASSIST using the least restrictive device within 3 treatment day(s). (3.)Ms. Constantine Ramirez will ambulate with MINIMAL ASSIST for 50 feet with the least restrictive device within 3 treatment day(s). LTG:  (1.)Ms. Constantine Ramirez will move from supine to sit and sit to supine , scoot up and down and roll side to side in bed with SUPERVISION within 7 treatment day(s). (2.)Ms. Constantine Ramirez will transfer from bed to chair and chair to bed with SUPERVISION using the least restrictive device within 7 treatment day(s). (3.)Ms. Constantine Ramirez will ambulate with STAND BY ASSIST for 150+ feet with the least restrictive device within 7 treatment day(s). ________________________________________________________________________________________________       PHYSICAL THERAPY: Daily Note, Treatment Day: 1st, PM 6/25/2018  INPATIENT: Hospital Day: 3  [place] / Product Type: Medicare /      NAME/AGE/GENDER: Franco Sanchez is a 65 y.o. female   PRIMARY DIAGNOSIS: Hip fracture ([address]  left hip fracture Hip fracture (HCC) Hip fracture (HCC)  Procedure(s) (LRB):  FEMUR INSERTION INTRA MEDULLARY NAIL (Left)  1 Day Post-Op  ICD-10: Treatment Diagnosis:    · Generalized Muscle Weakness (M62.81)  · Difficulty in walking, Not elsewhere classified (R26.2)  · Repeated Falls (R29.6)   Precaution/Allergies:  Donepezil; Memantine; Tegaserod hydrogen maleate; Atorvastatin; Calcitonin (salmon); Colesevelam; Ezetimibe; Fluvastatin; Ibandronate sodium; Risedronate; Rosuvastatin; Rosuvastatin calcium;  Sertraline; Simvastatin; and Sulfa (sulfonamide antibiotics) ASSESSMENT:     Ms. Pablo Ling presents in chair from AM treatment. Pt lethargic but less so than this AM.  She was able to stand with mod assist x 2 and ambulate ~3' to [facility]. Sat and attempted to void but was unsuccessful. Stood and ambulated ~3' back to chair. The closer she got to the chair the more she began flexing to sit. She sat on the edge of the chair. Max assist to scoot back in chair. Performed bilateral AP's x 10. RN entered to do a bladder scan and treatment terminated. Her impairments include decreased functional mobility, decreased balance, decreased strength, decreased safety awareness, increased risk for falls. Pt would benefit from further PT while in house to address these impairments to help improve to prior level of independence. Anticipate pt will require continued skilled PT following discharge from hospital due to poor balance, safety and fall risk. This section established at most recent assessment   PROBLEM LIST (Impairments causing functional limitations):  1. Decreased Strength  2. Decreased ADL/Functional Activities  3. Decreased Transfer Abilities  4. Decreased Ambulation Ability/Technique  5. Decreased Balance  6. Increased Pain  7. Decreased Activity Tolerance  8. Decreased Pacing Skills  9. Decreased Flexibility/Joint Mobility  10. Decreased Knowledge of Precautions  11. Decreased Cognition   INTERVENTIONS PLANNED: (Benefits and precautions of physical therapy have been discussed with the patient.)  1. Balance Exercise  2. Bed Mobility  3. Gait Training  4. Home Exercise Program (HEP)  5. Neuromuscular Re-education/Strengthening  6. Therapeutic Activites  7. Therapeutic Exercise/Strengthening  8.  Transfer Training     TREATMENT PLAN: Frequency/Duration: twice daily for duration of hospital stay  Rehabilitation Potential For Stated Goals: [facility] REHABILITATION/EQUIPMENT: (at time of discharge pending progress): Due to the probability of continued deficits (see above) this patient will likely need continued skilled physical therapy after discharge. Equipment:    None at this time              HISTORY:   History of Present Injury/Illness (Reason for Referral):  65 y.o. female who has a PMH of CAD, s/p 8 stents, HTN, dementia, osteoporosis, neuropathy, anxiety and multiple mechanical falls with 3 hip fractures in the past, who came after she woke up and fell while going into the living room this morning. She lives with her son, which takes care of her. Patient was found lying on the floor, unable to walk with left leg externally rotated. She denied head trauma, LOC, dizziness, chest pain, sob, abdominal pain, diarrhea. Past Medical History/Comorbidities:   Ms. Ute Kimbrough  has a past medical history of CAD (coronary artery disease) and Hypertension. Ms. Ute Kimbrough  has a past surgical history that includes hx hysterectomy. Social History/Living Environment:   Home Environment: Private residence  # Steps to Enter: 0  One/Two Story Residence: One story  Living Alone: No  Support Systems: Child(pascual)  Patient Expects to be Discharged to[de-identified] Private residence  Current DME Used/Available at Home: Walker, rolling, Cane, straight  Tub or Shower Type: Tub/Shower combination  Prior Level of Function/Work/Activity:  Unclear. Pt has SPC and RW, but unable to attain data on use. Number of Personal Factors/Comorbidities that affect the Plan of Care: 1-2: MODERATE COMPLEXITY   EXAMINATION:   Most Recent Physical Functioning:   Gross Assessment:                  Posture:     Balance:  Sitting: Impaired  Sitting - Static: Fair (occasional)  Sitting - Dynamic: Fair (occasional) (-)  Standing - Static: Fair (-)  Standing - Dynamic : Fair (-) Bed Mobility:     Wheelchair Mobility:     Transfers:  Sit to Stand:  Moderate assistance;Assist x2  Stand to Sit: Moderate assistance;Assist x2  Gait:            Body Structures Involved:  1. Nerves  2. Voice/Speech  3. Bones  4. Joints  5. Muscles  6. Ligaments Body Functions Affected:  1. Mental  2. Sensory/Pain  3. Neuromusculoskeletal  4. Movement Related  5. Skin Related Activities and Participation Affected:  1. Learning and Applying Knowledge  2. General Tasks and Demands  3. Mobility  4. Self Care  5. Domestic Life  6. Community, Social and [place]   Number of elements that affect the Plan of Care: 3: MODERATE COMPLEXITY   CLINICAL PRESENTATION:   Presentation: Evolving clinical presentation with changing clinical characteristics: MODERATE COMPLEXITY   CLINICAL DECISION MAKIN [facility] Mobility Inpatient Short Form  How much difficulty does the patient currently have. .. Unable A Lot A Little None   1. Turning over in bed (including adjusting bedclothes, sheets and blankets)? [] 1   [x] 2   [] 3   [] 4   2. Sitting down on and standing up from a chair with arms ( e.g., wheelchair, bedside commode, etc.)   [] 1   [x] 2   [] 3   [] 4   3. Moving from lying on back to sitting on the side of the bed? [] 1   [x] 2   [] 3   [] 4   How much help from another person does the patient currently need. .. Total A Lot A Little None   4. Moving to and from a bed to a chair (including a wheelchair)? [] 1   [x] 2   [] 3   [] 4   5. Need to walk in hospital room? [] 1   [x] 2   [] 3   [] 4   6. Climbing 3-5 steps with a railing? [] 1   [x] 2   [] 3   [] 4   © , [employer] [place] [employer], under license to [employer]. All rights reserved      Score:  Initial: 12 Most Recent: X (Date: -- )    Interpretation of Tool:  Represents activities that are increasingly more difficult (i.e. Bed mobility, Transfers, Gait). Score 24 23 22-20 19-15 14-10 9-7 6     Modifier CH CI CJ CK CL CM CN      ?  Mobility - Walking and Moving Around:     - CURRENT STATUS: CL - 60%-79% impaired, limited or restricted    - GOAL STATUS: CK - 40%-59% impaired, limited or restricted    - D/C STATUS:  ---------------To be determined---------------  [place] / Product Type: Medicare /      Medical Necessity:     · Skilled intervention continues to be required due to decreased function. Reason for Services/Other Comments:  · Patient continues to require skilled intervention due to medical complications and patient unable to attend/participate in therapy as expected. Use of outcome tool(s) and clinical judgement create a POC that gives a: Questionable prediction of patient's progress: MODERATE COMPLEXITY            TREATMENT:   (In addition to Assessment/Re-Assessment sessions the following treatments were rendered)   Pre-treatment Symptoms/Complaints:  none  Pain: Initial:      Post Session:       Therapeutic Activity: (    8min): Therapeutic activities including  Chair transfers, [facility] transfers, AP's and Ambulation on level ground to improve mobility, strength, balance and coordination. Required maximal   to promote static and dynamic balance in standing, promote coordination of bilateral, lower extremity(s) and promote motor control of bilateral, lower extremity(s). Braces/Orthotics/Lines/Etc:   · IV  · nasal cannula  Treatment/Session Assessment:    · Response to Treatment:  See above  · Interdisciplinary Collaboration:   o Physical Therapy Assistant  o Registered Nurse  · After treatment position/precautions:   o Up in chair  o Bed alarm/tab alert on  o Bed/Chair-wheels locked  o Call light within reach  o Nurse at bedside  o family outside room   · Compliance with Program/Exercises: Will assess as treatment progresses. · Recommendations/Intent for next treatment session: \"Next visit will focus on advancements to more challenging activities and reduction in assistance provided\".   Total Treatment Duration:  PT Patient Time In/Time Out  Time In: 1408  Time Out: 25[address] Alejandra Webb, PTA

## 2018-06-25 NOTE — PROGRESS NOTES
Problem: Falls - Risk of  Goal: *Absence of Falls  Document Kali Fall Risk and appropriate interventions in the flowsheet. Outcome: Progressing Towards Goal  Fall Risk Interventions:  Mobility Interventions: Bed/chair exit alarm, Patient to call before getting OOB    Mentation Interventions: Bed/chair exit alarm, Increase mobility    Medication Interventions: Evaluate medications/consider consulting pharmacy    Elimination Interventions: Call light in reach, Patient to call for help with toileting needs    History of Falls Interventions: Evaluate medications/consider consulting pharmacy, Bed/chair exit alarm        Problem: Pressure Injury - Risk of  Goal: *Prevention of pressure injury  Document Bertram Scale and appropriate interventions in the flowsheet.    Outcome: Progressing Towards Goal  Pressure Injury Interventions:  Sensory Interventions: Assess changes in LOC    Moisture Interventions: Absorbent underpads, Limit adult briefs    Activity Interventions: Increase time out of bed, Pressure redistribution bed/mattress(bed type), PT/OT evaluation    Mobility Interventions: HOB 30 degrees or less, Pressure redistribution bed/mattress (bed type), PT/OT evaluation    Nutrition Interventions: Document food/fluid/supplement intake

## 2018-06-25 NOTE — PROGRESS NOTES
ORTH FRACTURE PROGRESS NOTE    2018  Admit Date:   2018    Post Op day: 1 Day Post-Op    Subjective:    Renae Post; SPEECH THERAPY AT BEDSIDE - FAMILY ADMITS PATIENT HAS COUGHED FOR YEARS    PT/OT:   Gait:                    Vital Signs:    Patient Vitals for the past 8 hrs:   BP Temp Pulse Resp SpO2   18 1206 99/62 98.4 °F (36.9 °C) 78 16 97 %     Temp (24hrs), Av.9 °F (36.6 °C), Min:97.5 °F (36.4 °C), Max:98.4 °F (36.9 °C)      Pain Control:   Pain Assessment  Pain Scale 1: Visual  Pain Intensity 1: 0  Pain Onset 1: post op  Pain Location 1: Hip  Pain Orientation 1: Left  Pain Description 1: Aching  Pain Intervention(s) 1: Medication (see MAR)    Meds:    Current Facility-Administered Medications   Medication Dose Route Frequency    alcohol 62% (NOZIN) nasal  1 Ampule  1 Ampule Topical Q12H    acetaminophen (TYLENOL) suppository 325 mg  325 mg Rectal Q4H PRN    albuterol (PROVENTIL VENTOLIN) nebulizer solution 2.5 mg  2.5 mg Nebulization Q4H PRN    sodium chloride (NS) flush 5-10 mL  5-10 mL IntraVENous Q8H    sodium chloride (NS) flush 5-10 mL  5-10 mL IntraVENous PRN    acetaminophen (TYLENOL) tablet 650 mg  650 mg Oral Q8H    oxyCODONE IR (ROXICODONE) tablet 5 mg  5 mg Oral Q4H PRN    ondansetron (ZOFRAN) injection 4 mg  4 mg IntraVENous Q4H PRN    docusate sodium (COLACE) capsule 100 mg  100 mg Oral BID    alum-mag hydroxide-simeth (MYLANTA) oral suspension 30 mL  30 mL Oral Q4H PRN    calcium-vitamin D (OS-ABBE) 500 mg-200 unit tablet  1 Tab Oral TID WITH MEALS    enoxaparin (LOVENOX) injection 30 mg  30 mg SubCUTAneous Q24H    haloperidol lactate (HALDOL) injection 0.5 mg  0.5 mg IntraMUSCular Q6H PRN    clonazePAM (KlonoPIN) tablet 0.5 mg  0.5 mg Oral BID PRN    traMADol (ULTRAM) tablet 50 mg  50 mg Oral Q6H PRN    hydrALAZINE (APRESOLINE) 20 mg/mL injection 10 mg  10 mg IntraVENous Q6H PRN    naloxone (NARCAN) injection 0.4 mg  0.4 mg IntraVENous PRN    carvedilol (COREG) tablet 3.125 mg  3.125 mg Oral BID WITH MEALS    QUEtiapine (SEROquel) tablet 25 mg  25 mg Oral QHS    ranolazine ER (RANEXA) tablet 500 mg  500 mg Oral BID       LAB:    Recent Labs      06/25/18   0524  06/23/18   0935   HCT  29.5*  36.1   HGB  9.9*  12.4   INR   --   1.0       24 Hour Assessment Issues:    Disoriented (baseline)    Discharge Planning: SNF    Transfuse PRBC's:      Assessment & Physician's Comment:  Dressing is clean, dry, and intact  Neurovascular checks within normal limits    Principal Problem:    Hip fracture ([address] (6/23/2018)    Active Problems:    HTN (hypertension) (6/23/2018)      Dementia (6/23/2018)      CAD (coronary artery disease) (6/23/2018)      Dyslipidemia (6/23/2018)      Neuropathy (6/23/2018)      Anxiety (6/23/2018)      Back pain (6/23/2018)      Osteoporosis (6/23/2018)      History of pelvic fracture (6/23/2018)      Bronchitis (6/23/2018)      Hypoxia (6/23/2018)        Plan:  [facility] SNF IN AM Lan Oppenheim, MD

## 2018-06-25 NOTE — PROGRESS NOTES
Problem: Falls - Risk of  Goal: *Absence of Falls  Document Kali Fall Risk and appropriate interventions in the flowsheet. Outcome: Progressing Towards Goal  Fall Risk Interventions:  Mobility Interventions: Bed/chair exit alarm, OT consult for ADLs, Patient to call before getting OOB, PT Consult for assist device competence, PT Consult for mobility concerns, Strengthening exercises (ROM-active/passive), Utilize walker, cane, or other assitive device    Mentation Interventions: Adequate sleep, hydration, pain control, Bed/chair exit alarm, Door open when patient unattended, Family/sitter at bedside, More frequent rounding, Reorient patient, Toileting rounds, Update white board    Medication Interventions: Assess postural VS orthostatic hypotension, Bed/chair exit alarm, Patient to call before getting OOB, Teach patient to arise slowly    Elimination Interventions: Bed/chair exit alarm, Call light in reach, Patient to call for help with toileting needs, Toilet paper/wipes in reach    History of Falls Interventions: Bed/chair exit alarm, Door open when patient unattended        Problem: Pressure Injury - Risk of  Goal: *Prevention of pressure injury  Document Bertram Scale and appropriate interventions in the flowsheet.    Outcome: Progressing Towards Goal  Pressure Injury Interventions:  Sensory Interventions: Assess changes in LOC, Keep linens dry and wrinkle-free, Maintain/enhance activity level, Minimize linen layers, Pressure redistribution bed/mattress (bed type), Sit a 90-degree angle/use footstool if needed    Moisture Interventions: Absorbent underpads, Apply protective barrier, creams and emollients, Limit adult briefs, Maintain skin hydration (lotion/cream), Minimize layers, Moisture barrier    Activity Interventions: Increase time out of bed, Pressure redistribution bed/mattress(bed type), PT/OT evaluation    Mobility Interventions: HOB 30 degrees or less, Pressure redistribution bed/mattress (bed type), PT/OT evaluation    Nutrition Interventions: Document food/fluid/supplement intake    Friction and Shear Interventions: Apply protective barrier, creams and emollients, Foam dressings/transparent film/skin sealants

## 2018-06-25 NOTE — PROGRESS NOTES
Pt having a difficulty swallowing and is still very drowsy. Discussed this with hospitalist. Pt to be NPO and speech eval ordered.

## 2018-06-25 NOTE — PROGRESS NOTES
Spiritual Care Visit, initial visit. Visited with patient at bedside. Prayed for patient's healing and health, and for family as they plan for her transition to rehab. Visit by Javier Han, Staff .  Janel, Mario.ARLIN., B.A.

## 2018-06-25 NOTE — PROGRESS NOTES
Bed offer from [facility] for tomorrow, bed accepted by daughters and selected in CC link.

## 2018-06-25 NOTE — PROGRESS NOTES
1. Patient will verbalize and demonstrate understanding of hip precautions with 100% accuracy during ADL. 2. Patient will complete functional transfers with CGA and adaptive equipment as needed. 3. Patient will complete lower body bathing and dressing with min A and adaptive equipment as needed. 4. Patient will complete toileting with min A.   5. Patient will tolerate at least 10 minutes of BUE therapeutic exercises to increase strength in BUE to aid in functional transfers. 6. Patient will tolerate at least 23 minutes of OT treatment with no rest breaks to increase activity tolerance for ADLs. Timeframe: 7 visits       OCCUPATIONAL THERAPY: Daily Note, Treatment Day: 1st and AM    6/25/2018  INPATIENT: Hospital Day: 3  [place] / [place] / Product Type: Medicare /      NAME/AGE/GENDER: Nyasia Soni is a 65 y.o. female   PRIMARY DIAGNOSIS:  Hip fracture ([facility] [address] [facility]  left hip fracture Hip fracture (HCC) Hip fracture (HCC)  Procedure(s) (LRB):  FEMUR INSERTION INTRA MEDULLARY NAIL (Left)  1 Day Post-Op  ICD-10: Treatment Diagnosis:    · Generalized Muscle Weakness (M62.81)  · History of falling (Z91.81)   Precautions/Allergies:     Donepezil; Memantine; Tegaserod hydrogen maleate; Atorvastatin; Calcitonin (salmon); Colesevelam; Ezetimibe; Fluvastatin; Ibandronate sodium; Risedronate; Rosuvastatin; Rosuvastatin calcium; Sertraline; Simvastatin; and Sulfa (sulfonamide antibiotics)      ASSESSMENT:     Ms. Puma Coleman presents in supine upon arrival. Pt was very sleepy, but able to follow most commands. Pt required mod a with supine to sit transfer with fair sitting balance. Pt combed her hair with min a and additional time. Pt completed sit to stand with a rolling walker and mod a x's 2. Pt took steps toward the chair, but then pt began attempting to sit before reaching the chair requiring max a x's 2 to safely scoot her hips back in the chair.  Pt was positioned for comfort with posey in place and family at her side. Continue OT POC. This section established at most recent assessment   PROBLEM LIST (Impairments causing functional limitations):  1. Decreased Strength  2. Decreased ADL/Functional Activities  3. Decreased Transfer Abilities  4. Decreased Ambulation Ability/Technique  5. Decreased Balance  6. Increased Pain  7. Decreased Activity Tolerance   INTERVENTIONS PLANNED: (Benefits and precautions of occupational therapy have been discussed with the patient.)  1. Activities of daily living training  2. Adaptive equipment training  3. Balance training  4. Clothing management  5. Community reintergration  6. Donning&doffing training  7. Group therapy  8. Manual therapy training  9. Re-evaluation  10. Therapeutic activity  11. Therapeutic exercise     TREATMENT PLAN: Frequency/Duration: Follow patient 6x/week to address above goals. Rehabilitation Potential For Stated Goals: Good     RECOMMENDED REHABILITATION/EQUIPMENT: (at time of discharge pending progress): Due to the probability of continued deficits (see above) this patient will likely need continued skilled occupational therapy after discharge. Equipment:    TBD              OCCUPATIONAL PROFILE AND HISTORY:   History of Present Injury/Illness (Reason for Referral):  See h&P  Past Medical History/Comorbidities:   Ms. Laurie Benavidez  has a past medical history of CAD (coronary artery disease) and Hypertension. Ms. Laurie Benavidez  has a past surgical history that includes hx hysterectomy. Social History/Living Environment:   Home Environment: Private residence  # Steps to Enter: 0  One/Two Story Residence: One story  Living Alone: No  Support Systems: Child(pascual)  Patient Expects to be Discharged to[de-identified] Private residence  Current DME Used/Available at Home: Walker, rolling, Aydin Cramp, straight  Tub or Shower Type: Tub/Shower combination  Prior Level of Function/Work/Activity:  Independent in ADLs and fx mobility.    Personal Factors:          Sex: female        Age:  65 y.o. Past/Current Experience:  Hx of falls         Other factors that influence how disability is experienced by the patient:  Multiple co-morbidities    Number of Personal Factors/Comorbidities that affect the Plan of Care: Expanded review of therapy/medical records (1-2):  MODERATE COMPLEXITY   ASSESSMENT OF OCCUPATIONAL PERFORMANCE[de-identified]   Activities of Daily Living:   Basic ADLs (From Assessment) Complex ADLs (From Assessment)   Feeding: Independent  Oral Facial Hygiene/Grooming: Independent  Bathing: Maximum assistance  Upper Body Dressing: Minimum assistance  Lower Body Dressing: Maximum assistance  Toileting: Maximum assistance Instrumental ADL  Meal Preparation: Total assistance  Homemaking: Total assistance   Grooming/Bathing/Dressing Activities of Daily Living                             Bed/Mat Mobility  Supine to Sit: Moderate assistance  Sit to Stand: Moderate assistance;Assist x2  Scooting: Maximum assistance       Most Recent Physical Functioning:   Gross Assessment:                  Posture:     Balance:  Sitting: Impaired  Sitting - Static: Fair (occasional)  Sitting - Dynamic: Fair (occasional) (-)  Standing: Impaired  Standing - Static: Fair (-)  Standing - Dynamic : Poor Bed Mobility:  Supine to Sit: Moderate assistance  Scooting: Maximum assistance  Wheelchair Mobility:     Transfers:  Sit to Stand: Moderate assistance;Assist x2  Stand to Sit: Moderate assistance;Assist x2            Patient Vitals for the past 6 hrs:   BP BP Patient Position SpO2 Pulse   06/25/18 1206 99/62 Sitting; At rest 97 % 78       Mental Status  Neurologic State: Confused, Drowsy  Orientation Level: Oriented to person, Disoriented to place, Disoriented to situation, Disoriented to time  Cognition: Decreased command following, Impaired decision making, Memory loss, Poor safety awareness, Recognition of people/places  Perception: Appears intact  Perseveration: No perseveration noted  Safety/Judgement: Decreased awareness of environment, Fall prevention, Home safety                          Physical Skills Involved:  1. Range of Motion  2. Balance  3. Strength  4. Activity Tolerance  5. Gross Motor Control  6. Pain (acute) Cognitive Skills Affected (resulting in the inability to perform in a timely and safe manner):  1. Executive Function  2. Immediate Memory  3. Short Term Recall  4. Long Term Memory Psychosocial Skills Affected:  1. Habits/Routines  2. Environmental Adaptation   Number of elements that affect the Plan of Care: 5+:  HIGH COMPLEXITY   CLINICAL DECISION MAKIN[address] [place] [address] AM-PAC 6 Clicks   Daily Activity Inpatient Short Form  How much help from another person does the patient currently need. .. Total A Lot A Little None   1. Putting on and taking off regular lower body clothing? [] 1   [x] 2   [] 3   [] 4   2. Bathing (including washing, rinsing, drying)? [] 1   [x] 2   [] 3   [] 4   3. Toileting, which includes using toilet, bedpan or urinal?   [] 1   [x] 2   [] 3   [] 4   4. Putting on and taking off regular upper body clothing? [] 1   [] 2   [x] 3   [] 4   5. Taking care of personal grooming such as brushing teeth? [] 1   [] 2   [] 3   [x] 4   6. Eating meals? [] 1   [] 2   [] 3   [x] 4   © , Trustees of [address] [place] [address], under license to [employer]. All rights reserved      Score:  Initial: 17 Most Recent: X (Date: -- )    Interpretation of Tool:  Represents activities that are increasingly more difficult (i.e. Bed mobility, Transfers, Gait). Score 24 23 22-20 19-15 14-10 9-7 6     Modifier CH CI CJ CK CL CM CN      ?  Self Care:     - CURRENT STATUS: CK - 40%-59% impaired, limited or restricted    - GOAL STATUS: CJ - 20%-39% impaired, limited or restricted    - D/C STATUS:  ---------------To be determined---------------  [place] / [place] / Product Type: Medicare /      Medical Necessity:     · Patient is expected to demonstrate progress in strength, range of motion, balance, coordination and functional technique to increase independence with ADLs and functional mobility. Reason for Services/Other Comments:  · Patient continues to require skilled intervention due to medical complications and patient unable to attend/participate in therapy as expected. Use of outcome tool(s) and clinical judgement create a POC that gives a: MODERATE COMPLEXITY         TREATMENT:   (In addition to Assessment/Re-Assessment sessions the following treatments were rendered)     Pre-treatment Symptoms/Complaints:    Pain: Initial:   Pain Intensity 1: 0  Post Session:  Same     Therapeutic Activity: (23 minutes): Therapeutic activities including Bed transfers, Chair transfers and static/dynamic standing  to improve mobility and strength. Required moderate assist x's 2  to promote static balance in standing. Braces/Orthotics/Lines/Etc:   · IV  · flores catheter  · O2 Device: Room air  Treatment/Session Assessment:    · Response to Treatment:  Decreased safety due to dementia  · Interdisciplinary Collaboration:   o Certified Occupational Therapy Assistant  o Registered Nurse  · After treatment position/precautions:   o Up in chair  o Bed alarm/tab alert on  o Bed/Chair-wheels locked  o Call light within reach  o RN notified  o Family at bedside   · Compliance with Program/Exercises: Will assess as treatment progresses. · Recommendations/Intent for next treatment session: \"Next visit will focus on advancements to more challenging activities and reduction in assistance provided\".   Total Treatment Duration:  OT Patient Time In/Time Out  Time In: 1130  Time Out: [address] [facility]

## 2018-06-25 NOTE — PROGRESS NOTES
Hospitalist Progress Note     Admit Date:  2018  6:36 AM   Name:  Marybel Bazan   Age:  65 y.o.  :  1940   MRN:  [id number]   PCP:  Raul Jefferson MD  Treatment Team: Attending Provider: Jemal Ladd MD; Medical Assistant: Melyssa Cornejo; Care Manager: Romina Santos RN    Subjective:   Pt is a 64 yo female with PMH CAD (s/p 8 stents), HTN, dementia, osteoporosis, neuropathy, anxiety, multiple mech falls. Pt has had 3 hip fractures previously. Pt woke up duing the night and got out of bed without help and fell going into the living room. Pt was found on the floor, unable to walk/bear weight. Neg for head trauma, LOC, dizziness, CP, SOB, abd pain, diarrhea. Pt saw her PCP the day before and received zithromax for bronchitis. POD #1 Pt had been very anxious and confused but is now somnolent. Norris d/c per protocol. Doing well from surgery perspective. Pt's daughters are looking at [facility] [address] [place] [address], PT/OT to work with pt. Pt was congested and gurgly after eating/drinking - concern for aspiration so will place NPO and order ST assess. Pt has not gotten anything but tylenol for pain so will order in suppository form. Nursing will update ortho re NPO status.       Objective:     Patient Vitals for the past 24 hrs:   Temp Pulse Resp BP SpO2   18 1206 98.4 °F (36.9 °C) 78 16 99/62 97 %   18 0735 98.2 °F (36.8 °C) 77 16 139/73 97 %   18 0453 97.7 °F (36.5 °C) 87 16 135/72 90 %   18 0153 - 74 - (!) 83/60 -   18 0144 97.9 °F (36.6 °C) (!) 108 18 (!) 88/52 95 %   18 1954 97.5 °F (36.4 °C) 65 18 123/74 98 %   18 1545 99.5 °F (37.5 °C) 82 18 148/73 91 %     Oxygen Therapy  O2 Sat (%): 97 % (18 1206)  Pulse via Oximetry: 74 beats per minute (18)  O2 Device: Room air (18)  O2 Flow Rate (L/min): 4 l/min (18 0910)    Intake/Output Summary (Last 24 hours) at 18 1455  Last data filed at 18 0740   Gross per 24 hour Intake                0 ml   Output              600 ml   Net             -600 ml         General:    Thin. Lethargic/sleepy. Head:  Normocephalic, atraumatic  Eyes:  Extraocular movements intact, normal sclera  CV:   RRR. No murmurs, clicks, or gallops  Lungs:   Unlabored, no cyanosis  Abdomen:   Soft, nondistended, nontender. Extremities: Warm and dry. No edema. Skin:     No rashes or jaundice. Neuro: Moves all extremities without focal deficit  Psych:  Unable to asses, pt to sleepy    Data Review:  I have reviewed all labs, meds, telemetry events, and studies from the last 24 hours. Recent Results (from the past 24 hour(s))   MSSA/MRSA SC BY PCR, NASAL SWAB    Collection Time: 06/24/18 10:20 PM   Result Value Ref Range    Special Requests: NO SPECIAL REQUESTS      Culture result: (A)       MRSA target DNA not detected, SA target DNA detected. A MRSA negative, SA positive test result does not preclude MRSA nasal colonization. Culture result:        RESULTS VERIFIED, PHONED TO AND READ BACK BY  SIS SCOTT AT [address] [id number] BY [facility]     CBC WITH AUTOMATED DIFF    Collection Time: 06/25/18  5:24 AM   Result Value Ref Range    WBC 7.4 4.3 - 11.1 K/uL    RBC 3.21 (L) 4.05 - 5.25 M/uL    HGB 9.9 (L) 11.7 - 15.4 g/dL    HCT 29.5 (L) 35.8 - 46.3 %    MCV 91.9 79.6 - 97.8 FL    MCH 30.8 26.1 - 32.9 PG    MCHC 33.6 31.4 - 35.0 g/dL    RDW 13.7 11.9 - 14.6 %    PLATELET 483 (L) 119 - 450 K/uL    MPV 9.5 (L) 10.8 - 14.1 FL    DF AUTOMATED      NEUTROPHILS 76 43 - 78 %    LYMPHOCYTES 15 13 - 44 %    MONOCYTES 8 4.0 - 12.0 %    EOSINOPHILS 1 0.5 - 7.8 %    BASOPHILS 0 0.0 - 2.0 %    IMMATURE GRANULOCYTES 0 0.0 - 5.0 %    ABS. NEUTROPHILS 5.6 1.7 - 8.2 K/UL    ABS. LYMPHOCYTES 1.1 0.5 - 4.6 K/UL    ABS. MONOCYTES 0.6 0.1 - 1.3 K/UL    ABS. EOSINOPHILS 0.1 0.0 - 0.8 K/UL    ABS. BASOPHILS 0.0 0.0 - 0.2 K/UL    ABS. IMM.  GRANS. 0.0 0.0 - 0.5 K/UL   PLEASE READ & DOCUMENT PPD TEST IN 48 HRS    Collection Time: 06/25/18  1:19 PM   Result Value Ref Range    PPD neg Negative    mm Induration 0 mm        All Micro Results     Procedure Component Value Units Date/Time    CULTURE, URINE [[id number]] Collected:  06/23/18 1105    Order Status:  Completed Specimen:  Urine from Norris Specimen Updated:  06/25/18 0740     Special Requests: NO SPECIAL REQUESTS        Culture result: NO GROWTH 2 DAYS       MSSA/MRSA SC BY PCR, NASAL SWAB [[id number]]  (Abnormal) Collected:  06/24/18 2220    Order Status:  Completed Specimen:  Swab Updated:  06/25/18 0505     Special Requests: NO SPECIAL REQUESTS        Culture result:         MRSA target DNA not detected, SA target DNA detected. A MRSA negative, SA positive test result does not preclude MRSA nasal colonization.  (A)            RESULTS VERIFIED, PHONED TO AND READ BACK BY  SIS SCOTT AT [place] [address] [place] [id number] BY [facility]            Current Meds:  Current Facility-Administered Medications   Medication Dose Route Frequency    alcohol 62% (NOZIN) nasal  1 Ampule  1 Ampule Topical Q12H    acetaminophen (TYLENOL) suppository 325 mg  325 mg Rectal Q4H PRN    lactated Ringers infusion  75 mL/hr IntraVENous CONTINUOUS    sodium chloride (NS) flush 5-10 mL  5-10 mL IntraVENous Q8H    sodium chloride (NS) flush 5-10 mL  5-10 mL IntraVENous PRN    acetaminophen (TYLENOL) tablet 650 mg  650 mg Oral Q8H    oxyCODONE IR (ROXICODONE) tablet 5 mg  5 mg Oral Q4H PRN    ondansetron (ZOFRAN) injection 4 mg  4 mg IntraVENous Q4H PRN    docusate sodium (COLACE) capsule 100 mg  100 mg Oral BID    alum-mag hydroxide-simeth (MYLANTA) oral suspension 30 mL  30 mL Oral Q4H PRN    calcium-vitamin D (OS-ABBE) 500 mg-200 unit tablet  1 Tab Oral TID WITH MEALS    enoxaparin (LOVENOX) injection 30 mg  30 mg SubCUTAneous Q24H    haloperidol lactate (HALDOL) injection 0.5 mg  0.5 mg IntraMUSCular Q6H PRN    clonazePAM (KlonoPIN) tablet 0.5 mg  0.5 mg Oral BID PRN    0.9% sodium chloride infusion 2,000 mL  2,000 mL IntraVENous CONTINUOUS    traMADol (ULTRAM) tablet 50 mg  50 mg Oral Q6H PRN    hydrALAZINE (APRESOLINE) 20 mg/mL injection 10 mg  10 mg IntraVENous Q6H PRN    naloxone (NARCAN) injection 0.4 mg  0.4 mg IntraVENous PRN    carvedilol (COREG) tablet 3.125 mg  3.125 mg Oral BID WITH MEALS    QUEtiapine (SEROquel) tablet 25 mg  25 mg Oral QHS    ranolazine ER (RANEXA) tablet 500 mg  500 mg Oral BID       Diet:  DIET NPO    Other Studies (last 24 hours):  No results found. Assessment and Plan:     Hospital Problems as of 6/25/2018  Date Reviewed: 6/23/2018          Codes Class Noted - Resolved POA    * (Principal)[facility] ICD-10-CM: S72.009A  ICD-9-CM: 820.8  6/23/2018 - Present Unknown        HTN (hypertension) ICD-10-CM: I10  ICD-9-CM: 401.9  6/23/2018 - Present Unknown        Dementia ICD-10-CM: F03.90  ICD-9-CM: 294.20  6/23/2018 - Present Unknown        CAD (coronary artery disease) ICD-10-CM: I25.10  ICD-9-CM: 414.00  6/23/2018 - Present Unknown        Dyslipidemia ICD-10-CM: E78.5  ICD-9-CM: 272.4  6/23/2018 - Present Unknown        Neuropathy ICD-10-CM: G62.9  ICD-9-CM: 355.9  6/23/2018 - Present Unknown        Anxiety ICD-10-CM: F41.9  ICD-9-CM: 300.00  6/23/2018 - Present Unknown        Back pain ICD-10-CM: M54.9  ICD-9-CM: 724.5  6/23/2018 - Present Unknown        Osteoporosis ICD-10-CM: M81.0  ICD-9-CM: 733.00  6/23/2018 - Present Unknown        History of pelvic fracture ICD-10-CM: Z87.81  ICD-9-CM: V15.51  6/23/2018 - Present Unknown        Bronchitis ICD-10-CM: J40  ICD-9-CM: 877  6/23/2018 - Present Yes        Hypoxia ICD-10-CM: R09.02  ICD-9-CM: 799.02  6/23/2018 - Present Yes              A/P:    1. Left hip fracture after a mechanical fall  IVF and pain meds per ortho  PT/OT  PPD  CM consult  Jr d/c    2. History of recent bronchitis, started on zithromax yesterday by her outside MD Tee  Cont zithromax  Oxygen wean as appropriate    3. Acute hypoxic respiratory failure, possible related to her underlying bronchitis, opiate use  Oxygen, wean as appropriate    4. CAD, no chest pain today  Resume home meds  EKG    5.HTN, out of control, possible due to pain  Resume home meds  Monitor  Hydralazine prn    6. Dementia   Close supervision  Haldol per ortho order    7. Possible dysphagia  ST to assess swallow  NPO until they can assess    DC planning/Dispo:  STR  DVT ppx:  SCDs    Case reviewed with supervising MD Walt CEBALLOS.  Urszula Rodriguez MD    Signed:  ANTHONY LocoC

## 2018-06-25 NOTE — PROGRESS NOTES
Problem: Mobility Impaired (Adult and Pediatric)  Goal: *Acute Goals and Plan of Care (Insert Text)  STG:  (1.)Ms. Adeola Briseno will move from supine to sit and sit to supine , scoot up and down and roll side to side with CONTACT GUARD ASSIST within 3 treatment day(s). (2.)Ms. Adeola Briseno will transfer from bed to chair and chair to bed with CONTACT GUARD ASSIST using the least restrictive device within 3 treatment day(s). (3.)Ms. Adeola Briseno will ambulate with MINIMAL ASSIST for 50 feet with the least restrictive device within 3 treatment day(s). LTG:  (1.)Ms. Adeola Briseno will move from supine to sit and sit to supine , scoot up and down and roll side to side in bed with SUPERVISION within 7 treatment day(s). (2.)Ms. Adeola Briseno will transfer from bed to chair and chair to bed with SUPERVISION using the least restrictive device within 7 treatment day(s). (3.)Ms. Adeola Briseno will ambulate with STAND BY ASSIST for 150+ feet with the least restrictive device within 7 treatment day(s). ________________________________________________________________________________________________       PHYSICAL THERAPY: Daily Note, Treatment Day: 1st, AM 6/25/2018  INPATIENT: Hospital Day: 3  [place] / Product Type: Medicare /      NAME/AGE/GENDER: Flavio Magallon is a 65 y.o. female   PRIMARY DIAGNOSIS: Hip fracture ([facility] [address]  left hip fracture Hip fracture (HCC) Hip fracture (HCC)  Procedure(s) (LRB):  FEMUR INSERTION INTRA MEDULLARY NAIL (Left)  1 Day Post-Op  ICD-10: Treatment Diagnosis:    · Generalized Muscle Weakness (M62.81)  · Difficulty in walking, Not elsewhere classified (R26.2)  · Repeated Falls (R29.6)   Precaution/Allergies:  Donepezil; Memantine; Tegaserod hydrogen maleate; Atorvastatin; Calcitonin (salmon); Colesevelam; Ezetimibe; Fluvastatin; Ibandronate sodium; Risedronate; Rosuvastatin; Rosuvastatin calcium;  Sertraline; Simvastatin; and Sulfa (sulfonamide antibiotics) ASSESSMENT:     Ms. Rissa Rivera presents sitting on EOB on [employer] and rehab tech. Pt very lethargic. She was able to stand with mod assist x 2 and ambulate ~5' to chair. The closer she got to the chair the more she began flexing. She sat impulsively on the edge of the chair. Max assist to scoot back in chair. Pt much too lethargic to perform ex. Her impairments include decreased functional mobility, decreased balance, decreased strength, decreased safety awareness, increased risk for falls. Pt would benefit from further PT while in house to address these impairments to help improve to prior level of independence. Anticipate pt will require continued skilled PT following discharge from hospital due to poor balance, safety and fall risk. This section established at most recent assessment   PROBLEM LIST (Impairments causing functional limitations):  1. Decreased Strength  2. Decreased ADL/Functional Activities  3. Decreased Transfer Abilities  4. Decreased Ambulation Ability/Technique  5. Decreased Balance  6. Increased Pain  7. Decreased Activity Tolerance  8. Decreased Pacing Skills  9. Decreased Flexibility/Joint Mobility  10. Decreased Knowledge of Precautions  11. Decreased Cognition   INTERVENTIONS PLANNED: (Benefits and precautions of physical therapy have been discussed with the patient.)  1. Balance Exercise  2. Bed Mobility  3. Gait Training  4. Home Exercise Program (HEP)  5. Neuromuscular Re-education/Strengthening  6. Therapeutic Activites  7. Therapeutic Exercise/Strengthening  8. Transfer Training     TREATMENT PLAN: Frequency/Duration: twice daily for duration of hospital stay  Rehabilitation Potential For Stated Goals: Bradley Camacho REHABILITATION/EQUIPMENT: (at time of discharge pending progress): Due to the probability of continued deficits (see above) this patient will likely need continued skilled physical therapy after discharge.   Equipment:    None at this time              HISTORY: History of Present Injury/Illness (Reason for Referral):  65 y.o. female who has a PMH of CAD, s/p 8 stents, HTN, dementia, osteoporosis, neuropathy, anxiety and multiple mechanical falls with 3 hip fractures in the past, who came after she woke up and fell while going into the living room this morning. She lives with her son, which takes care of her. Patient was found lying on the floor, unable to walk with left leg externally rotated. She denied head trauma, LOC, dizziness, chest pain, sob, abdominal pain, diarrhea. Past Medical History/Comorbidities:   Ms. Renan Skinner  has a past medical history of CAD (coronary artery disease) and Hypertension. Ms. Renan Skinner  has a past surgical history that includes hx hysterectomy. Social History/Living Environment:   Home Environment: Private residence  # Steps to Enter: 0  One/Two Story Residence: One story  Living Alone: No  Support Systems: Child(pascual)  Patient Expects to be Discharged to[de-identified] Private residence  Current DME Used/Available at Home: Walker, rolling, Cane, straight  Tub or Shower Type: Tub/Shower combination  Prior Level of Function/Work/Activity:  Unclear. Pt has SPC and RW, but unable to attain data on use. Number of Personal Factors/Comorbidities that affect the Plan of Care: 1-2: MODERATE COMPLEXITY   EXAMINATION:   Most Recent Physical Functioning:   Gross Assessment:                  Posture:     Balance:  Sitting: Impaired  Sitting - Static: Fair (occasional)  Sitting - Dynamic: Fair (occasional) (-)  Standing - Static: Fair (-)  Standing - Dynamic : Poor Bed Mobility:     Wheelchair Mobility:     Transfers:  Sit to Stand: Moderate assistance;Assist x2  Stand to Sit: Moderate assistance;Assist x2  Gait:            Body Structures Involved:  1. Nerves  2. Voice/Speech  3. Bones  4. Joints  5. Muscles  6. Ligaments Body Functions Affected:  1. Mental  2. Sensory/Pain  3. Neuromusculoskeletal  4. Movement Related  5.  Skin Related Activities and Participation Affected:  1. Learning and Applying Knowledge  2. General Tasks and Demands  3. Mobility  4. Self Care  5. Domestic Life  6. Community, Social and [place]   Number of elements that affect the Plan of Care: 3: MODERATE COMPLEXITY   CLINICAL PRESENTATION:   Presentation: Evolving clinical presentation with changing clinical characteristics: MODERATE COMPLEXITY   CLINICAL DECISION MAKIN [facility] Mobility Inpatient Short Form  How much difficulty does the patient currently have. .. Unable A Lot A Little None   1. Turning over in bed (including adjusting bedclothes, sheets and blankets)? [] 1   [x] 2   [] 3   [] 4   2. Sitting down on and standing up from a chair with arms ( e.g., wheelchair, bedside commode, etc.)   [] 1   [x] 2   [] 3   [] 4   3. Moving from lying on back to sitting on the side of the bed? [] 1   [x] 2   [] 3   [] 4   How much help from another person does the patient currently need. .. Total A Lot A Little None   4. Moving to and from a bed to a chair (including a wheelchair)? [] 1   [x] 2   [] 3   [] 4   5. Need to walk in hospital room? [] 1   [x] 2   [] 3   [] 4   6. Climbing 3-5 steps with a railing? [] 1   [x] 2   [] 3   [] 4   © , Trustees of [address] [place] [address], under license to [employer]. All rights reserved      Score:  Initial: 12 Most Recent: X (Date: -- )    Interpretation of Tool:  Represents activities that are increasingly more difficult (i.e. Bed mobility, Transfers, Gait). Score 24 23 22-20 19-15 14-10 9-7 6     Modifier CH CI CJ CK CL CM CN      ?  Mobility - Walking and Moving Around:     - CURRENT STATUS: CL - 60%-79% impaired, limited or restricted    - GOAL STATUS: CK - 40%-59% impaired, limited or restricted    - D/C STATUS:  ---------------To be determined---------------  [place] / Product Type: Medicare /      Medical Necessity:     · Skilled intervention continues to be required due to decreased function. Reason for Services/Other Comments:  · Patient continues to require skilled intervention due to medical complications and patient unable to attend/participate in therapy as expected. Use of outcome tool(s) and clinical judgement create a POC that gives a: Questionable prediction of patient's progress: MODERATE COMPLEXITY            TREATMENT:   (In addition to Assessment/Re-Assessment sessions the following treatments were rendered)   Pre-treatment Symptoms/Complaints:  none  Pain: Initial:      Post Session:       Therapeutic Activity: (    8min): Therapeutic activities including  Chair transfers and Ambulation on level ground to improve mobility, strength, balance and coordination. Required maximal   to promote static and dynamic balance in standing, promote coordination of bilateral, lower extremity(s) and promote motor control of bilateral, lower extremity(s). Braces/Orthotics/Lines/Etc:   · IV  · flores catheter  · O2 Device: Room air  Treatment/Session Assessment:    · Response to Treatment:  See above  · Interdisciplinary Collaboration:   o Physical Therapy Assistant  o Certified Occupational Therapy Assistant  o Registered Nurse  o Rehabilitation Attendant  · After treatment position/precautions:   o Up in chair  o Bed alarm/tab alert on  o Bed/Chair-wheels locked  o Call light within reach  o RN notified  o Family at bedside   · Compliance with Program/Exercises: Will assess as treatment progresses. · Recommendations/Intent for next treatment session: \"Next visit will focus on advancements to more challenging activities and reduction in assistance provided\".   Total Treatment Duration:  PT Patient Time In/Time Out  Time In: 1154  Time Out: [address] Jon, PTA

## 2018-06-25 NOTE — PROGRESS NOTES
Met with daughters at bedside, plans for STR at [place], list provided and family wished to visit several facilities prior to selections, asked for referrals to [facility]- [facility], referrals faxed to all facilities, will await response. Pt lives alone but is never left alone has family with her 24/7 states the house is 1 level, has cane and rolling walker in home for use, prior to admission she was independent with feeding and bathing. Pt has hx of Dementia. Asked to use Nicole Donato (daughter) [phone] as contact for needs. PPD placed, PT/OT working with pt. CM will continue to follow for needs. Care Management Interventions  PCP Verified by CM:  Yes  Transition of Care Consult (CM Consult): Discharge Planning, SNF  Current Support Network: Own Home, Lives Alone  Confirm Follow Up Transport: Family  Plan discussed with Pt/Family/Caregiver: Yes  Freedom of Choice Offered: Yes  Discharge Location  Discharge Placement: Skilled nursing facility

## 2018-06-26 ENCOUNTER — APPOINTMENT (OUTPATIENT)
Dept: GENERAL RADIOLOGY | Age: 78
DRG: 480 | End: 2018-06-26
Attending: NURSE PRACTITIONER
Payer: MEDICARE

## 2018-06-26 VITALS
BODY MASS INDEX: 14.94 KG/M2 | HEART RATE: 89 BPM | HEIGHT: 66 IN | DIASTOLIC BLOOD PRESSURE: 92 MMHG | TEMPERATURE: 97.7 F | RESPIRATION RATE: 16 BRPM | SYSTOLIC BLOOD PRESSURE: 192 MMHG | WEIGHT: 93 LBS | OXYGEN SATURATION: 96 %

## 2018-06-26 LAB
BASOPHILS # BLD: 0 K/UL (ref 0–0.2)
BASOPHILS NFR BLD: 0 % (ref 0–2)
DIFFERENTIAL METHOD BLD: ABNORMAL
EOSINOPHIL # BLD: 0 K/UL (ref 0–0.8)
EOSINOPHIL NFR BLD: 1 % (ref 0.5–7.8)
ERYTHROCYTE [DISTWIDTH] IN BLOOD BY AUTOMATED COUNT: 13.6 % (ref 11.9–14.6)
HCT VFR BLD AUTO: 23.5 % (ref 35.8–46.3)
HCT VFR BLD AUTO: 32 % (ref 35.8–46.3)
HGB BLD-MCNC: 10.9 G/DL (ref 11.7–15.4)
HGB BLD-MCNC: 8 G/DL (ref 11.7–15.4)
IMM GRANULOCYTES # BLD: 0 K/UL (ref 0–0.5)
IMM GRANULOCYTES NFR BLD AUTO: 0 % (ref 0–5)
LYMPHOCYTES # BLD: 0.8 K/UL (ref 0.5–4.6)
LYMPHOCYTES NFR BLD: 15 % (ref 13–44)
MCH RBC QN AUTO: 30.7 PG (ref 26.1–32.9)
MCHC RBC AUTO-ENTMCNC: 34 G/DL (ref 31.4–35)
MCV RBC AUTO: 90 FL (ref 79.6–97.8)
MM INDURATION POC: 0 MM (ref 0–5)
MONOCYTES # BLD: 0.5 K/UL (ref 0.1–1.3)
MONOCYTES NFR BLD: 8 % (ref 4–12)
NEUTS SEG # BLD: 4.2 K/UL (ref 1.7–8.2)
NEUTS SEG NFR BLD: 76 % (ref 43–78)
PLATELET # BLD AUTO: 125 K/UL (ref 150–450)
PMV BLD AUTO: 9.4 FL (ref 10.8–14.1)
PPD POC: NORMAL NEGATIVE
RBC # BLD AUTO: 2.61 M/UL (ref 4.05–5.25)
WBC # BLD AUTO: 5.5 K/UL (ref 4.3–11.1)

## 2018-06-26 PROCEDURE — 85018 HEMOGLOBIN: CPT | Performed by: NURSE PRACTITIONER

## 2018-06-26 PROCEDURE — 92526 ORAL FUNCTION THERAPY: CPT

## 2018-06-26 PROCEDURE — 73502 X-RAY EXAM HIP UNI 2-3 VIEWS: CPT

## 2018-06-26 PROCEDURE — 73552 X-RAY EXAM OF FEMUR 2/>: CPT

## 2018-06-26 PROCEDURE — 74011250636 HC RX REV CODE- 250/636: Performed by: NURSE PRACTITIONER

## 2018-06-26 PROCEDURE — 74230 X-RAY XM SWLNG FUNCJ C+: CPT

## 2018-06-26 PROCEDURE — 77030020255 HC SOL INJ LR 1000ML BG

## 2018-06-26 PROCEDURE — P9016 RBC LEUKOCYTES REDUCED: HCPCS | Performed by: EMERGENCY MEDICINE

## 2018-06-26 PROCEDURE — 77030020263 HC SOL INJ SOD CL0.9% LFCR 1000ML

## 2018-06-26 PROCEDURE — 36430 TRANSFUSION BLD/BLD COMPNT: CPT

## 2018-06-26 PROCEDURE — 85025 COMPLETE CBC W/AUTO DIFF WBC: CPT | Performed by: NURSE PRACTITIONER

## 2018-06-26 PROCEDURE — 36415 COLL VENOUS BLD VENIPUNCTURE: CPT | Performed by: NURSE PRACTITIONER

## 2018-06-26 PROCEDURE — 74011250636 HC RX REV CODE- 250/636: Performed by: ORTHOPAEDIC SURGERY

## 2018-06-26 PROCEDURE — 74011250637 HC RX REV CODE- 250/637: Performed by: INTERNAL MEDICINE

## 2018-06-26 PROCEDURE — 97116 GAIT TRAINING THERAPY: CPT

## 2018-06-26 PROCEDURE — 97110 THERAPEUTIC EXERCISES: CPT

## 2018-06-26 PROCEDURE — 77030039270 HC TU BLD FLTR CARD -A

## 2018-06-26 PROCEDURE — 30233N1 TRANSFUSION OF NONAUTOLOGOUS RED BLOOD CELLS INTO PERIPHERAL VEIN, PERCUTANEOUS APPROACH: ICD-10-PCS | Performed by: EMERGENCY MEDICINE

## 2018-06-26 PROCEDURE — 74011000250 HC RX REV CODE- 250: Performed by: FAMILY MEDICINE

## 2018-06-26 PROCEDURE — 74011000255 HC RX REV CODE- 255: Performed by: INTERNAL MEDICINE

## 2018-06-26 PROCEDURE — 92611 MOTION FLUOROSCOPY/SWALLOW: CPT

## 2018-06-26 PROCEDURE — 77030011256 HC DRSG MEPILEX <16IN NO BORD MOLN -A

## 2018-06-26 RX ORDER — TRAMADOL HYDROCHLORIDE 50 MG/1
50 TABLET ORAL
Qty: 10 TAB | Refills: 0 | Status: SHIPPED | OUTPATIENT
Start: 2018-06-26 | End: 2018-06-26

## 2018-06-26 RX ORDER — FERROUS SULFATE, DRIED 160(50) MG
1 TABLET, EXTENDED RELEASE ORAL
Qty: 30 TAB | Refills: 0 | Status: SHIPPED | OUTPATIENT
Start: 2018-06-26 | End: 2018-07-06

## 2018-06-26 RX ORDER — SODIUM CHLORIDE 9 MG/ML
250 INJECTION, SOLUTION INTRAVENOUS AS NEEDED
Status: DISCONTINUED | OUTPATIENT
Start: 2018-06-26 | End: 2018-06-26 | Stop reason: HOSPADM

## 2018-06-26 RX ORDER — ACETAMINOPHEN 10 MG/ML
1000 INJECTION, SOLUTION INTRAVENOUS ONCE
Status: COMPLETED | OUTPATIENT
Start: 2018-06-26 | End: 2018-06-26

## 2018-06-26 RX ORDER — ENALAPRILAT 1.25 MG/ML
1.25 INJECTION INTRAVENOUS
Status: DISCONTINUED | OUTPATIENT
Start: 2018-06-26 | End: 2018-06-26 | Stop reason: HOSPADM

## 2018-06-26 RX ORDER — SODIUM CHLORIDE, SODIUM LACTATE, POTASSIUM CHLORIDE, CALCIUM CHLORIDE 600; 310; 30; 20 MG/100ML; MG/100ML; MG/100ML; MG/100ML
75 INJECTION, SOLUTION INTRAVENOUS CONTINUOUS
Status: DISCONTINUED | OUTPATIENT
Start: 2018-06-26 | End: 2018-06-26 | Stop reason: HOSPADM

## 2018-06-26 RX ORDER — ACETAMINOPHEN 10 MG/ML
1000 INJECTION, SOLUTION INTRAVENOUS ONCE
Status: DISCONTINUED | OUTPATIENT
Start: 2018-06-26 | End: 2018-06-26 | Stop reason: HOSPADM

## 2018-06-26 RX ORDER — ACETAMINOPHEN 325 MG/1
650 TABLET ORAL
Status: DISCONTINUED | OUTPATIENT
Start: 2018-06-26 | End: 2018-06-26 | Stop reason: CLARIF

## 2018-06-26 RX ORDER — ENOXAPARIN SODIUM 100 MG/ML
30 INJECTION SUBCUTANEOUS EVERY 24 HOURS
Qty: 28 SYRINGE | Refills: 0 | Status: SHIPPED | OUTPATIENT
Start: 2018-06-27

## 2018-06-26 RX ORDER — ACETAMINOPHEN 500 MG
1000 TABLET ORAL EVERY 8 HOURS
Status: DISCONTINUED | OUTPATIENT
Start: 2018-06-26 | End: 2018-06-26 | Stop reason: HOSPADM

## 2018-06-26 RX ADMIN — HALOPERIDOL LACTATE 0.5 MG: 5 INJECTION, SOLUTION INTRAMUSCULAR at 01:22

## 2018-06-26 RX ADMIN — Medication 1 AMPULE: at 07:57

## 2018-06-26 RX ADMIN — ACETAMINOPHEN 1000 MG: 10 INJECTION, SOLUTION INTRAVENOUS at 01:05

## 2018-06-26 RX ADMIN — BARIUM SULFATE 15 ML: 400 PASTE ORAL at 14:03

## 2018-06-26 RX ADMIN — ENOXAPARIN SODIUM 30 MG: 100 INJECTION SUBCUTANEOUS at 07:56

## 2018-06-26 RX ADMIN — ENALAPRILAT 1.25 MG: 1.25 INJECTION INTRAVENOUS at 01:11

## 2018-06-26 RX ADMIN — SODIUM CHLORIDE, SODIUM LACTATE, POTASSIUM CHLORIDE, AND CALCIUM CHLORIDE 75 ML/HR: 600; 310; 30; 20 INJECTION, SOLUTION INTRAVENOUS at 01:16

## 2018-06-26 RX ADMIN — BARIUM SULFATE 30 ML: 400 SUSPENSION ORAL at 14:02

## 2018-06-26 RX ADMIN — BARIUM SULFATE 30 ML: 980 POWDER, FOR SUSPENSION ORAL at 14:01

## 2018-06-26 RX ADMIN — ACETAMINOPHEN 1000 MG: 500 TABLET, FILM COATED ORAL at 15:13

## 2018-06-26 RX ADMIN — BARIUM SULFATE 45 ML: 400 SUSPENSION ORAL at 14:03

## 2018-06-26 RX ADMIN — CARVEDILOL 3.12 MG: 3.12 TABLET, FILM COATED ORAL at 15:13

## 2018-06-26 NOTE — PROGRESS NOTES
STG: Pt will participate with modified barium swallow study x1  LTG: Pt will tolerate the least restrictive diet at discharge without respiratory compromise    Speech language pathology: bedside swallow note: Daily Note 1    NAME/AGE/GENDER: Sadie Reece is a 65 y.o. female  DATE: 6/26/2018  PRIMARY DIAGNOSIS: Hip fracture (HCC)  left hip fracture  Procedure(s) (LRB):  FEMUR INSERTION INTRA MEDULLARY NAIL (Left) 2 Days Post-Op  ICD-10: Treatment Diagnosis: dysphagia; oropharyngeal R13.12  INTERDISCIPLINARY COLLABORATION: Registered Nurse and MD  PRECAUTIONS/ALLERGIES: Donepezil; Memantine; Tegaserod hydrogen maleate; Atorvastatin; Calcitonin (salmon); Colesevelam; Ezetimibe; Fluvastatin; Ibandronate sodium; Risedronate; Rosuvastatin; Rosuvastatin calcium; Sertraline; Simvastatin; and Sulfa (sulfonamide antibiotics) ASSESSMENT:Patient seen with po trials. No overt signs/sx of aspiration with tsp amounts of thin liquids. Delayed, strong coughing episode following cup sips. Similar presentation with honey thick liquids with delayed coughing after the swallow and expulsion of sputum. Patient dx with bronchitis but increased coughing noted specifically following po trials and patient made NPO after suspected aspiration event yesterday. She would benefit from objective assessment of swallowing prior to discharge. Recommend modified barium swallow study (MBS) this morning. RN reports MD has provided consent to proceed. Family in agreement. Patient will benefit from skilled intervention to address the below impairments. ?????? ? ? This section established at most recent assessment??????????  PROBLEM LIST (Impairments causing functional limitations):  1. dysphagia  REHABILITATION POTENTIAL FOR STATED GOALS: Good  PLAN OF CARE:   Patient will benefit from skilled intervention to address the following impairments.   RECOMMENDATIONS AND PLANNED INTERVENTIONS (Benefits and precautions of therapy have been discussed with the patient.):  · NPO except meds in puree  MEDICATIONS:  · Crushed in puree  COMPENSATORY STRATEGIES/MODIFICATIONS INCLUDING:  · Small sips and bites  OTHER RECOMMENDATIONS (including follow up treatment recommendations): · Family training/education  · Patient education  · modified barium swallow study  RECOMMENDED DIET MODIFICATIONS DISCUSSED WITH:  · Hospitalist  · Medical Sub-Specialist  · Nursing  · Family  · Patient  FREQUENCY/DURATION: Continue to follow patient 3 times a week for duration of hospital stay to address above goals. RECOMMENDED REHABILITATION/EQUIPMENT: (at time of discharge pending progress): Due to the probability of continued deficits (see above) this patient will likely need continued skilled speech therapy after discharge. SUBJECTIVE:   Confused. History of Present Injury/Illness: Ms. Malick Munoz  has a past medical history of CAD (coronary artery disease) and Hypertension. .She also  has a past surgical history that includes hx hysterectomy. Present Symptoms: cough  Pain Intensity 1: 0  Pain Location 1: Hip  Pain Orientation 1: Left  Pain Intervention(s) 1: Medication (see MAR)  Current Medications:   No current facility-administered medications on file prior to encounter. No current outpatient prescriptions on file prior to encounter.      Current Dietary Status:  NPO        Social History/Home Situation: home with family  Home Environment: Private residence  # Steps to Enter: 0  One/Two Story Residence: One story  Living Alone: No  Support Systems: Child(pascual)  Patient Expects to be Discharged to[de-identified] Private residence  Current DME Used/Available at Home: Koleen Clifford, rolling, Reida Nipple, straight  Tub or Shower Type: Tub/Shower combination  OBJECTIVE:   Respiratory Status:        CXR Results:n/a  MRI/CT Results:n/a  Oral Motor Structure/Speech:  Oral-Motor Structure/Motor Speech  Labial: No impairment  Dentition: Upper dentures, Natural  Lingual: No impairment    Cognitive and Communication Status:  Neurologic State: Alert;Confused  Orientation Level: Oriented to person  Cognition: Memory loss  Perception: Appears intact  Perseveration: No perseveration noted  Safety/Judgement: Decreased insight into deficits    BEDSIDE SWALLOW EVALUATION  Oral Assessment:  Oral Assessment  Labial: No impairment  Dentition: Upper dentures; Natural  Lingual: No impairment  P.O. Trials:  Patient Position: upright in chair    The patient was given tsp to straw amounts of the following:   Consistency Presented:  Thin liquid  How Presented: Self-fed/presented;Cup/sip;Spoon    ORAL PHASE:  Bolus Acceptance: No impairment  Bolus Formation/Control: Impaired  Propulsion: No impairment  Type of Impairment: Delayed;Mastication  Oral Residue: None    PHARYNGEAL PHASE:  Initiation of Swallow: No impairment     Aspiration Signs/Symptoms: Delayed cough/throat clear;Strong cough;Runny nose              Pharyngeal Phase Characteristics: Audible swallow    OTHER OBSERVATIONS:  Rate/bite size: WNL   Endurance:  Questionable     Tool Used: Dysphagia Outcome and Severity Scale (MARY)    Score Comments   Normal Diet  [] 7 With no strategies or extra time needed   Functional Swallow  [] 6 May have mild oral or pharyngeal delay       Mild Dysphagia    [] 5 Which may require one diet consistency restricted (those who demonstrate penetration which is entirely cleared on MBS would be included)   Mild-Moderate Dysphagia  [] 4 With 1-2 diet consistencies restricted       Moderate Dysphagia  [] 3 With 2 or more diet consistencies restricted       Moderately Severe Dysphagia  [x] 2 With partial PO strategies (trials with ST only)       Severe Dysphagia  [] 1 With inability to tolerate any PO safely          Score:  Initial: 2 Most Recent: X (Date: -- )   Interpretation of Tool: The Dysphagia Outcome and Severity Scale (MARY) is a simple, easy-to-use, 7-point scale developed to systematically rate the functional severity of dysphagia based on objective assessment and make recommendations for diet level, independence level, and type of nutrition. Score 7 6 5 4 3 2 1   Modifier CH CI CJ CK CL CM CN   ? Swallowing:     - CURRENT STATUS: CM - 80%-99% impaired, limited or restricted    - GOAL STATUS:  CK - 40%-59% impaired, limited or restricted    - D/C STATUS:  ---------------To be determined---------------  [place] / Product Type: Medicare /     TREATMENT:    (In addition to Assessment/Re-Assessment sessions the following treatments were rendered)  Dysphagia Activities: Activities/Procedures listed utilized to improve progress in swallow function. Required maximal cueing to improve swallow safety. MODALITIES:                                                                    ORAL MOTOR  EXERCISES:                                                                                                                                                                      LARYNGEAL / PHARYNGEAL EXERCISES:                                                                                                                                     __________________________________________________________________________________________________  Safety:   After treatment position/precautions:  · RN notified  · Family at bedside  · Upright in Bed  Progression/Medical Necessity:   · Skilled intervention continues to be required due to persistent signs and symptoms of aspiration and unable to attend/participate in therapy as expected. Compliance with Program/Exercises: Will assess as treatment progresses. Reason for Continuation of Services/Other Comments:  · Patient continues to require skilled intervention due to patient unable to attend/participate in therapy as expected. Recommendations/Intent for next treatment session: \"Treatment next visit will focus on modified barium swallow study\".     Total Treatment Duration:  Time In: 0836  Time Out: [address] [place] [employer] MS, CCC-SLP

## 2018-06-26 NOTE — PROGRESS NOTES
TRANSFER - OUT REPORT:    Verbal report given to Marla RN(name) on Hao Dunbar  being transferred to [facility](unit) for routine progression of care       Report consisted of patients Situation, Background, Assessment and   Recommendations(SBAR). Information from the following report(s) SBAR, Kardex, ED Summary, OR Summary, Intake/Output, MAR, Accordion, Recent Results and Med Rec Status was reviewed with the receiving nurse. Lines:       Opportunity for questions and clarification was provided.       Patient transported with:   [facility]

## 2018-06-26 NOTE — PROGRESS NOTES
PT Daily Note  Attempted to see patient for physical therapy this afternoon but patient is off the floor for a modified barium swallow study. Will check back on patient at a later date/time.   Thank you,  Paulina Whitten, PTA

## 2018-06-26 NOTE — PROGRESS NOTES
STG: Pt will participate with modified barium swallow study x1 (goal met 6/26/18)  STG: Pt will tolerate mechanical soft/honey thick liquids by cup without overt signs/sx of aspiration with 100% accuracy (goal added 6/26/18)  STG: Pt will participate with laryngeal exercises x10 with 80% accuracy (goal added 6/26/18)  LTG: Pt will tolerate the least restrictive diet at discharge without respiratory compromise    Speech language pathology: modified barium swallow study: Initial Assessment    NAME/AGE/GENDER: Lisa Cordoba is a 65 y.o. female  DATE: 6/26/2018  PRIMARY DIAGNOSIS: Hip fracture (HCC)  left hip fracture  Procedure(s) (LRB):  FEMUR INSERTION INTRA MEDULLARY NAIL (Left) 2 Days Post-Op  ICD-10: Treatment Diagnosis: dysphagia; oropharyngeal R13.12  INTERDISCIPLINARY COLLABORATION: radiologist  PRECAUTIONS/ALLERGIES: Donepezil; Memantine; Tegaserod hydrogen maleate; Atorvastatin; Calcitonin (salmon); Colesevelam; Ezetimibe; Fluvastatin; Ibandronate sodium; Risedronate; Rosuvastatin; Rosuvastatin calcium; Sertraline; Simvastatin; and Sulfa (sulfonamide antibiotics) ASSESSMENT/PLAN OF CARE:Based on the objective data described below, Ms. Ybarra  presents with moderate oropharyngeal dysphagia. Laryngeal penetration with tsp amount of thin liquids. Premature spillage of the majority of the bolus to the pyriform sinuses prior to the swallow with no laryngeal closure resulting in deep laryngeal penetration prior to the swallow and trace aspiration during the swallow with a cup sip of thin liquids. Continued laryngeal penetration with nectar liquids that does not spontaneously clear from the trachea. Honey thick liquids by cup were well tolerated with no penetration/aspiration. Delayed AP transit of the bolus with honey thick via the straw with transient penetration with serial swallows. No penetration/aspiration with pudding, mixed, or regular textures.   Mild increased mastication time required with the cracker. Min-mod vallecular residue with solid trials which reduces with subsequent swallows and a liquid wash. Recommend mechanical soft textures/HONEY THICK LIQUIDS BY CUP ONLY. MEDS CRUSHED IN PUREE. Fully upright with all po intake. Only when fully awake/alert. Patient will benefit from skilled intervention to address the below impairments. ?????? ? ? This section established at most recent assessment??????????  RECOMMENDATIONS AND PLANNED INTERVENTIONS (Benefits and precautions of therapy have been discussed with the patient.):  · PO:  Mechanical soft with chopped meat and vegetables  · Liquids:  honey by cup  MEDICATIONS:  · Crushed in puree  COMPENSATORY STRATEGIES/MODIFICATIONS INCLUDING:  · Small sips and bites  · No straws  OTHER RECOMMENDATIONS (including follow up treatment recommendations): · Family training/education  · Laryngeal exercises  · Patient education  FREQUENCY/DURATION: Continue to follow patient 3 times a week for duration of hospital stay to address above goals. RECOMMENDED REHABILITATION/EQUIPMENT: (at time of discharge pending progress): Due to the probability of continued deficits (see above) this patient will likely need continued skilled speech therapy after discharge. SUBJECTIVE:   Confused but cooperative. History of Present Injury/Illness: Ms. Jaquelin Nicole  has a past medical history of CAD (coronary artery disease) and Hypertension. .She also  has a past surgical history that includes hx hysterectomy.   Present Symptoms: increased coughing with po  Pain Intensity 1: 0  Pain Location 1: Hip  Pain Orientation 1: Left  Pain Intervention(s) 1: Medication (see MAR)  Current Dietary Status:  NPO   Radiologist: Dr Schuyler Snyder Situation: [facility]: Private residence  # Steps to Enter: 0  One/Two Story Residence: One story  Living Alone: No  Support Systems: Child(pascual)  Patient Expects to be Discharged to[de-identified] Private residence  Current DME Used/Available at Home: Walker, rolling, Cane, straight  Tub or Shower Type: Tub/Shower combination  OBJECTIVE:     Cognitive/Communication Status:  Mental Status  Neurologic State: Alert, Confused  Orientation Level: Oriented to person  Cognition: Memory loss  Perception: Appears intact  Perseveration: No perseveration noted  Safety/Judgement: Fall prevention, Decreased insight into deficits    Oral Assessment:  Oral Assessment  Labial: No impairment  Dentition: Upper dentures, Natural  Lingual: Decreased rate    Vocal Quality: WFL    Patient Viewed: Patient Position: upright in chair  Film Views: Lateral, Fluoro    Oral Prepatory:  The patient was given the following: Consistency Presented:  Thin liquid, Honey thick liquid, Nectar thick liquid, Pudding, Solid, Mixed consistency  How Presented: Spoon, Straw, Self-fed/presented, Cup/sip    Oral Phase:  Bolus Acceptance: No impairment  Bolus Formation/Control: Impaired  Propulsion: Delayed (# of seconds)  Type of Impairment: Delayed, Spillage, Premature spillage, Mastication  Oral Residue: Lingual  Initiation of Swallow: Triggered at pyriform sinus(es), Triggered at vallecula  Oral Phase Severity: Mild    Pharyngeal Phase:  Timing: Pooling 1-5 sec, Pooling 6-10 sec  Decreased Tongue Base Retraction?: Yes  Laryngeal Elevation: Inadequate epiglottic inversion, Incomplete laryngeal closure  Penetration: Before swallow, During swallow, To cords, To laryngeal vestibule, From initial swallow  Aspiration/Timing: Silent , During  Aspiration/Penetration Score: 8 (Aspiration-Contrast passes cords/glottis with no effort to eject, ie/silent aspiration)  Pharyngeal Symmetry: Not assessed  Pharyngeal Dysfunction: Decreased tongue base retraction, Decreased strength, Decreased elevation/closure  Pharyngeal Phase Severity: Moderate  Pharyngeal-Esophageal Segment: No impairment    Assessment/Reassessment only, no treatment provided today    Tool Used: Dysphagia Outcome and Severity Scale (MARY) Score Comments   Normal Diet  [] 7 With no strategies or extra time needed       Functional Swallow  [] 6 May have mild oral or pharyngeal delay       Mild Dysphagia    [] 5 Which may require one diet consistency restricted (those who demonstrate penetration which is entirely cleared on MBS would be included)   Mild-Moderate Dysphagia  [] 4 With 1-2 diet consistencies restricted       Moderate Dysphagia  [x] 3 With 2 or more diet consistencies restricted       Moderately Severe Dysphagia  [] 2 With partial PO strategies (trials with ST only)       Severe Dysphagia  [] 1 With inability to tolerate any PO safely          Score:  Initial: 3 Most Recent: X (Date: -- )   Interpretation of Tool: The Dysphagia Outcome and Severity Scale (MARY) is a simple, easy-to-use, 7-point scale developed to systematically rate the functional severity of dysphagia based on objective assessment and make recommendations for diet level, independence level, and type of nutrition. Score 7 6 5 4 3 2 1   Modifier CH CI CJ CK CL CM CN   ?  Swallowing:     - CURRENT STATUS: CL - 60%-79% impaired, limited or restricted    - GOAL STATUS:  CK - 40%-59% impaired, limited or restricted    - D/C STATUS:  ---------------To be determined---------------  [place] / [place] / Product Type: Medicare /   __________________________________________________________________________________________________  Safety:   After treatment position/precautions:  · MD notified  · Upright in Bed within holding bay  Recommendations for treatment: laryngeal exercises  Total Treatment Duration:  Time In: 1342   Time Out: [address] MS, CCC-SLP

## 2018-06-26 NOTE — PROGRESS NOTES
OT Note:    OT attempted to see patient today for therapy. Pt is waiting to go down for MBS. Will re-attempt to see patient at a later date/time.     Thanks,  Melanie Cotton

## 2018-06-26 NOTE — PROGRESS NOTES
Called to pts room this am, daughters at bedside now requesting CM to check on bed status at [facility], called spoke with Esteban Rivera and today they have a bed available and can take pt today, made daughters aware and would rather go to [facility] for SNF, bed accepted. Efra Manzanares NP made aware of bed available and now pt getting blood transfusion and pending swallow eval. Will have to wait until after lunch to see it pt will be DC to SNF. (4) completely dependent

## 2018-06-26 NOTE — PROGRESS NOTES
Pt medically ready for DC to [facility] for SNF, pt will be going to room 316, report number [phone], EMS transport arranged with [facility] for 1530 pm today, daughters and sister at bedside, and aware and agreeable to pending transport time

## 2018-06-26 NOTE — PROGRESS NOTES
Notified Dr. Analisa Butterfield of patient's BP and that the hospital is out of Hydralazine. New orders received, see MAR.

## 2018-06-26 NOTE — PROGRESS NOTES
SPEECH PATHOLOGY NOTE:    Modified barium swallow study (MBS) re-scheduled to 1:30 today.     Butch Ruiz MS, CCC-SLP

## 2018-06-26 NOTE — PROGRESS NOTES
Problem: Falls - Risk of  Goal: *Absence of Falls  Document Kali Fall Risk and appropriate interventions in the flowsheet. Outcome: Progressing Towards Goal  Fall Risk Interventions:  Mobility Interventions: Bed/chair exit alarm, OT consult for ADLs, Patient to call before getting OOB, PT Consult for mobility concerns, PT Consult for assist device competence    Mentation Interventions: Bed/chair exit alarm, Evaluate medications/consider consulting pharmacy    Medication Interventions: Evaluate medications/consider consulting pharmacy    Elimination Interventions: Bed/chair exit alarm, Elevated toilet seat    History of Falls Interventions: Bed/chair exit alarm, Evaluate medications/consider consulting pharmacy        Problem: Pressure Injury - Risk of  Goal: *Prevention of pressure injury  Document Bertram Scale and appropriate interventions in the flowsheet.    Outcome: Progressing Towards Goal  Pressure Injury Interventions:  Sensory Interventions: Assess changes in LOC, Keep linens dry and wrinkle-free, Minimize linen layers, Maintain/enhance activity level, Monitor skin under medical devices, Pad between skin to skin    Moisture Interventions: Absorbent underpads, Apply protective barrier, creams and emollients, Limit adult briefs, Maintain skin hydration (lotion/cream), Minimize layers, Moisture barrier    Activity Interventions: Increase time out of bed, Pressure redistribution bed/mattress(bed type), PT/OT evaluation    Mobility Interventions: HOB 30 degrees or less, Pressure redistribution bed/mattress (bed type), PT/OT evaluation    Nutrition Interventions: Document food/fluid/supplement intake    Friction and Shear Interventions: Apply protective barrier, creams and emollients, Foam dressings/transparent film/skin sealants, HOB 30 degrees or less, Minimize layers, Sit at 90-degree angle, Transfer aides:transfer board/Torie lift/ceiling lift

## 2018-06-26 NOTE — DISCHARGE SUMMARY
Discharge Summary   Patient ID:  Clara Sauceda  [id number]  12 y.o.  1940  Admit date: 6/23/2018  6:36 AM  Discharge date and time: 6/26/2018  Attending: Myles Hanna MD  PCP:  Jasmin Germain MD  Treatment Team: Attending Provider: Myles Hanna MD; Medical Assistant: Denise Shelton;  Care Manager: Peewee Plata, RN; Speech Language Pathologist: JAMAL Giles  Principal Diagnosis Hip fracture [facility])   Principal Problem:    Hip fracture ([facility] (6/23/2018)    Active Problems:    HTN (hypertension) (6/23/2018)      Dementia (6/23/2018)      CAD (coronary artery disease) (6/23/2018)      Dyslipidemia (6/23/2018)      Neuropathy (6/23/2018)      Anxiety (6/23/2018)      Back pain (6/23/2018)      Osteoporosis (6/23/2018)      History of pelvic fracture (6/23/2018)      Bronchitis (6/23/2018)      Hypoxia (6/23/2018)       * Admission Diagnoses: Hip fracture ([facility]  left hip fracture  * Discharge Diagnoses:    Hospital Problems as of 6/26/2018  Date Reviewed: 6/23/2018          Codes Class Noted - Resolved POA    * (Principal)Hip fracture ([facility] ICD-10-CM: S72.009A  ICD-9-CM: 820.8  6/23/2018 - Present Unknown        HTN (hypertension) ICD-10-CM: I10  ICD-9-CM: 401.9  6/23/2018 - Present Unknown        Dementia ICD-10-CM: F03.90  ICD-9-CM: 294.20  6/23/2018 - Present Unknown        CAD (coronary artery disease) ICD-10-CM: I25.10  ICD-9-CM: 414.00  6/23/2018 - Present Unknown        Dyslipidemia ICD-10-CM: E78.5  ICD-9-CM: 272.4  6/23/2018 - Present Unknown        Neuropathy ICD-10-CM: G62.9  ICD-9-CM: 355.9  6/23/2018 - Present Unknown        Anxiety ICD-10-CM: F41.9  ICD-9-CM: 300.00  6/23/2018 - Present Unknown        Back pain ICD-10-CM: M54.9  ICD-9-CM: 724.5  6/23/2018 - Present Unknown        Osteoporosis ICD-10-CM: M81.0  ICD-9-CM: 733.00  6/23/2018 - Present Unknown        History of pelvic fracture ICD-10-CM: Z87.81  ICD-9-CM: V15.51  6/23/2018 - Present Unknown        Bronchitis ICD-10-CM: J40  ICD-9-CM: 940  6/23/2018 - Present Yes        Hypoxia ICD-10-CM: R09.02  ICD-9-CM: 799.02  6/23/2018 - Present Yes                Hospital Course:  Ms. Jules Castellanos is a 66 yo female with PMH of CAD s/p 8 stents, HTN, dementia, osteoporosis, neuropathy, anxiety, frequent falls with 3 previous hip fx who presented after falling on the floor during the night. She was unable to bear weight or walk. She was found to have a left femoral neck fx. She is POD #2 from femur insertion intramedullary nail. She was being tx with zithromax outpatient for bronchitis. On exam during hospitalization there were concerns for aspiration with coughing with food. Speech therapy consulted and recommended NPO and MBS. MBS today showed aspiration with thin liquids. Speech recommends mechanical soft diet, honey thick liquids, no straws, no mixed consistencies. Diagnostic Study/Procedure results summary copied from within [facility] EMR:  2 Days Post-Op  Procedure(s): FEMUR INSERTION INTRA MEDULLARY NAIL             Portable chest x-ray     Lung fields clear. There is lower thoracic scoliosis. Cardiac silhouette and  soft tissues are intact     IMPRESSION: lower thoracic scoliosis, clear lung fields      Left hip June 23, 2018     Indication pain     3 views demonstrate a remote left ischial pubic ramus fracture. There is an  acute displaced fracture at the base of the left femoral neck with varus  angulation of the distal fracture fragment. Soft tissues are edematous     IMPRESSION  IMPRESSION: Left femoral neck fracture, remote pelvic fractures      Left femur: 6/23/2018  INDICATION: Trauma     4 images are remarkable for left ischial pubic ramus fractures. There is a  subacute fracture of the left femoral neck with varus angulation of the distal  fracture fragments.     IMPRESSION  IMPRESSION: Subacute left femoral neck fracture.       6 VIEW PORTABLE LEFT FEMUR:     CLINICAL HISTORY:  Intraoperative evaluation.     TECHNIQUE:  Examination was monitored in the OR by Dr. Sasha Love. Fluoroscopy  time was 3.4 minutes with 6 spot images presented.     COMPARISON:  June 23, 2018.     FINDINGS:  These images are presented for review at 1103 hours. They  demonstrate placement of an intramedullary kendra and compression screw transfixing  the basicervical fracture. Principal fragments are in adequate alignment. Fine  bony detail is not rendered on these portable images.     IMPRESSION  IMPRESSION:  BASICERVICAL FRACTURE STATUS POST ORIF. Labs: Results:       Chemistry No results for input(s): GLU, NA, K, CL, CO2, BUN, CREA, CA, AGAP, BUCR, TBIL, TBILI, GPT, ALT, AP, TP, ALB, GLOB, AGRAT in the last 72 hours. CBC w/Diff Recent Labs      06/26/18   0511  06/25/18   0524   WBC  5.5  7.4   RBC  2.61*  3.21*   HGB  8.0*  9.9*   HCT  23.5*  29.5*   PLT  125*  139*   GRANS  76  76   LYMPH  15  15   EOS  1  1      Cardiac Enzymes No results for input(s): CPK, CKND1, CUCA in the last 72 hours. No lab exists for component: CKRMB, TROIP   Coagulation Recent Labs      06/24/18   0430   APTT  39.1*       Lipid Panel @BRIEFLAB(CHOL,CHOLPOCT,830329,[id number],CHOLX,CHOLP,CHLST,CHOLV,592856,HDL,HDLPOC,HDLPOCT,207469,NHDLCT,[id number],HDLC,HDLP,LDL,LDLPOCT,LDLCPOC,705367,NLDLCT,DLDL,LDLC,DLDLP,674434,VLDLC,VLDL,TGL,TGLX,TRIGL,[id number],TRIGP,TGLPOCT,508830,862065,CHHD,CHHDX)@   BNP No results for input(s): BNPP in the last 72 hours. Liver Enzymes No results for input(s): TP, ALB, TBIL, AP, SGOT, GPT in the last 72 hours.     No lab exists for component: DBIL   Thyroid Studies No results found for: T4, T3U, TSH, TSHEXT, TSHEXT         Discharge Exam:  Visit Vitals    /80    Pulse 80    Temp 97.3 °F (36.3 °C)    Resp 16    Ht 5' 6\" (1.676 m)    Wt 42.2 kg (93 lb)    SpO2 97%    BMI 15.01 kg/m2     General appearance: drowsy, no distress, appears stated age  Lungs: clear to auscultation bilaterally, resp even and non labored  Heart: regular rate and rhythm, S1S2 without murmurs rubs gallops. No edema  Abdomen: soft, non-tender. Bowel sounds normal. Non distended  Extremities: no cyanosis. Limited ROM LLE  Neuro:  Drowsy, no focal deficits        Disposition: STR  Discharge Condition: stable  Patient Instructions:   Current Discharge Medication List      START taking these medications    Details   enoxaparin (LOVENOX) 30 mg/0.3 mL injection 0.3 mL by SubCUTAneous route every twenty-four (24) hours. Qty: 28 Syringe, Refills: 0      calcium-vitamin D (OYSTER SHELL) 500 mg(1,250mg) -200 unit per tablet Take 1 Tab by mouth three (3) times daily (with meals) for 10 days. Qty: 30 Tab, Refills: 0         CONTINUE these medications which have NOT CHANGED    Details   aspirin delayed-release 81 mg tablet Take  by mouth daily. cyanocobalamin, vitamin B-12, 2,500 mcg tab Take  by mouth.      lubiPROStone (AMITIZA) 8 mcg capsule Take 8 mcg by mouth. ranolazine ER (RANEXA) 500 mg SR tablet Take 500 mg by mouth two (2) times a day. carvedilol (COREG) 3.125 mg tablet Take 3.125 mg by mouth two (2) times daily (with meals). QUEtiapine (SEROQUEL) 25 mg tablet Take  by mouth.      pregabalin (LYRICA) 50 mg capsule Take 50 mg by mouth.      galantamine (RAZADYNE) 8 mg tablet Take 8 mg by mouth two (2) times a day.          STOP taking these medications       cholecalciferol (VITAMIN D3) 1,000 unit cap Comments:   Reason for Stopping:         rosuvastatin (CRESTOR) 10 mg tablet Comments:   Reason for Stopping:         azithromycin (ZITHROMAX) 250 mg tablet Comments:   Reason for Stopping:               Activity: PT/OT Eval and Treat  Diet: mechanical soft, honey thick liquids, no straws, no mixed consistencies   Wound Care: Keep wound clean and dry and As directed      Full code   Surrogate decision maker:  Daughter      Follow-up  ·   FU Ortho as scheduled  · FU PCP 2 days  · Lovenox to complete post op 28 days  · Tylenol for pain, holding sedating meds      Notes, labs, VS, diagnostic testing reviewed  Case discussed with pt, care team, Dr. Aragon First, daughter at bedside      Time spent to discharge patient 45 min    Signed:  Brady Pardo NP  6/26/2018  12:10 PM

## 2018-06-26 NOTE — PROGRESS NOTES
Problem: Mobility Impaired (Adult and Pediatric)  Goal: *Acute Goals and Plan of Care (Insert Text)  STG:  (1.)Ms. Laurie Benavidez will move from supine to sit and sit to supine , scoot up and down and roll side to side with CONTACT GUARD ASSIST within 3 treatment day(s). (2.)Ms. Laurie Benavidez will transfer from bed to chair and chair to bed with CONTACT GUARD ASSIST using the least restrictive device within 3 treatment day(s). (3.)Ms. Laurie Benavidez will ambulate with MINIMAL ASSIST for 50 feet with the least restrictive device within 3 treatment day(s). LTG:  (1.)Ms. Laurie Benavidez will move from supine to sit and sit to supine , scoot up and down and roll side to side in bed with SUPERVISION within 7 treatment day(s). (2.)Ms. Laurie Benavidez will transfer from bed to chair and chair to bed with SUPERVISION using the least restrictive device within 7 treatment day(s). (3.)Ms. Laurie Benavidez will ambulate with STAND BY ASSIST for 150+ feet with the least restrictive device within 7 treatment day(s). ________________________________________________________________________________________________       PHYSICAL THERAPY: Daily Note, Treatment Day: 2nd, AM 6/26/2018  INPATIENT: Hospital Day: 4  [place] / Product Type: Medicare /      NAME/AGE/GENDER: Allie Madrid is a 65 y.o. female   PRIMARY DIAGNOSIS: Hip fracture ([address]  left hip fracture Hip fracture (HCC) Hip fracture (HCC)  Procedure(s) (LRB):  FEMUR INSERTION INTRA MEDULLARY NAIL (Left)  2 Days Post-Op  ICD-10: Treatment Diagnosis:    · Generalized Muscle Weakness (M62.81)  · Difficulty in walking, Not elsewhere classified (R26.2)  · Repeated Falls (R29.6)   Precaution/Allergies:  Donepezil; Memantine; Tegaserod hydrogen maleate; Atorvastatin; Calcitonin (salmon); Colesevelam; Ezetimibe; Fluvastatin; Ibandronate sodium; Risedronate; Rosuvastatin; Rosuvastatin calcium;  Sertraline; Simvastatin; and Sulfa (sulfonamide antibiotics) ASSESSMENT:     Ms. Derrell Payne was supine upon contact and agreeable to PT. Patient is pleasantly confused but follows commands. Patient able to perform supine to sit with SBA and cues for technique. Once seated EOB patient participates in LE strengthening/ROM exercises to improve functional strength/ROM for transfers, gait and overall mobility. Patient requires cues and assistance to perform exercises correctly. Patient then transfers to standing with min assist and cues for hand placement/technique. Once standing patient able to perform gait training x 40' with use of rolling walker, min assist, and below cues in gait section. Overall good progress towards physical therapy goals. No goals have been met thus far. Will continue efforts. This section established at most recent assessment   PROBLEM LIST (Impairments causing functional limitations):  1. Decreased Strength  2. Decreased ADL/Functional Activities  3. Decreased Transfer Abilities  4. Decreased Ambulation Ability/Technique  5. Decreased Balance  6. Increased Pain  7. Decreased Activity Tolerance  8. Decreased Pacing Skills  9. Decreased Flexibility/Joint Mobility  10. Decreased Knowledge of Precautions  11. Decreased Cognition   INTERVENTIONS PLANNED: (Benefits and precautions of physical therapy have been discussed with the patient.)  1. Balance Exercise  2. Bed Mobility  3. Gait Training  4. Home Exercise Program (HEP)  5. Neuromuscular Re-education/Strengthening  6. Therapeutic Activites  7. Therapeutic Exercise/Strengthening  8. Transfer Training     TREATMENT PLAN: Frequency/Duration: twice daily for duration of hospital stay  Rehabilitation Potential For Stated Goals: [facility] REHABILITATION/EQUIPMENT: (at time of discharge pending progress): Due to the probability of continued deficits (see above) this patient will likely need continued skilled physical therapy after discharge.   Equipment:    None at this time HISTORY:   History of Present Injury/Illness (Reason for Referral):  65 y.o. female who has a PMH of CAD, s/p 8 stents, HTN, dementia, osteoporosis, neuropathy, anxiety and multiple mechanical falls with 3 hip fractures in the past, who came after she woke up and fell while going into the living room this morning. She lives with her son, which takes care of her. Patient was found lying on the floor, unable to walk with left leg externally rotated. She denied head trauma, LOC, dizziness, chest pain, sob, abdominal pain, diarrhea. Past Medical History/Comorbidities:   Ms. Harini Khan  has a past medical history of CAD (coronary artery disease) and Hypertension. Ms. Harini Khan  has a past surgical history that includes hx hysterectomy. Social History/Living Environment:   Home Environment: Private residence  # Steps to Enter: 0  One/Two Story Residence: One story  Living Alone: No  Support Systems: Child(pascual)  Patient Expects to be Discharged to[de-identified] Private residence  Current DME Used/Available at Home: Walker, rolling, Cane, straight  Tub or Shower Type: Tub/Shower combination  Prior Level of Function/Work/Activity:  Unclear. Pt has SPC and RW, but unable to attain data on use. Number of Personal Factors/Comorbidities that affect the Plan of Care: 1-2: MODERATE COMPLEXITY   EXAMINATION:   Most Recent Physical Functioning:   Gross Assessment:                  Posture:     Balance:  Sitting: Impaired  Sitting - Static: Good (unsupported)  Sitting - Dynamic: Fair (occasional)  Standing: Impaired  Standing - Static: Fair  Standing - Dynamic : Poor Bed Mobility:  Supine to Sit: Stand-by assistance  Sit to Supine: Minimum assistance  Wheelchair Mobility:     Transfers:  Sit to Stand: Minimum assistance  Stand to Sit: Minimum assistance  Gait:     Base of Support: Narrowed  Speed/Jeni: Shuffled; Slow  Step Length: Right shortened;Left shortened  Gait Abnormalities: Decreased step clearance;Trunk sway increased; Shuffling gait; Step to gait  Distance (ft): 40 Feet (ft)  Assistive Device: Walker, rolling  Ambulation - Level of Assistance: Minimal assistance  Interventions: Safety awareness training;Manual cues; Tactile cues; Verbal cues      Body Structures Involved:  1. Nerves  2. Voice/Speech  3. Bones  4. Joints  5. Muscles  6. Ligaments Body Functions Affected:  1. Mental  2. Sensory/Pain  3. Neuromusculoskeletal  4. Movement Related  5. Skin Related Activities and Participation Affected:  1. Learning and Applying Knowledge  2. General Tasks and Demands  3. Mobility  4. Self Care  5. Domestic Life  6. Community, Social and [place]   Number of elements that affect the Plan of Care: 3: MODERATE COMPLEXITY   CLINICAL PRESENTATION:   Presentation: Evolving clinical presentation with changing clinical characteristics: MODERATE COMPLEXITY   CLINICAL DECISION MAKIN [facility] Inpatient Short Form  How much difficulty does the patient currently have. .. Unable A Lot A Little None   1. Turning over in bed (including adjusting bedclothes, sheets and blankets)? [] 1   [x] 2   [] 3   [] 4   2. Sitting down on and standing up from a chair with arms ( e.g., wheelchair, bedside commode, etc.)   [] 1   [x] 2   [] 3   [] 4   3. Moving from lying on back to sitting on the side of the bed? [] 1   [x] 2   [] 3   [] 4   How much help from another person does the patient currently need. .. Total A Lot A Little None   4. Moving to and from a bed to a chair (including a wheelchair)? [] 1   [x] 2   [] 3   [] 4   5. Need to walk in hospital room? [] 1   [x] 2   [] 3   [] 4   6. Climbing 3-5 steps with a railing? [] 1   [x] 2   [] 3   [] 4   © , Trustees of [employer], under license to [employer].  All rights reserved      Score:  Initial: 12 Most Recent: X (Date: -- )    Interpretation of Tool:  Represents activities that are increasingly more difficult (i.e. Bed mobility, Transfers, Gait). Score 24 23 22-20 19-15 14-10 9-7 6     Modifier CH CI CJ CK CL CM CN      ? Mobility - Walking and Moving Around:     - CURRENT STATUS: CL - 60%-79% impaired, limited or restricted    - GOAL STATUS: CK - 40%-59% impaired, limited or restricted    - D/C STATUS:  ---------------To be determined---------------  [place] / Product Type: Medicare /      Medical Necessity:     · Skilled intervention continues to be required due to decreased function. Reason for Services/Other Comments:  · Patient continues to require skilled intervention due to medical complications and patient unable to attend/participate in therapy as expected. Use of outcome tool(s) and clinical judgement create a POC that gives a: Questionable prediction of patient's progress: MODERATE COMPLEXITY            TREATMENT:   (In addition to Assessment/Re-Assessment sessions the following treatments were rendered)   Pre-treatment Symptoms/Complaints:  none  Pain: Initial:   Pain Intensity 1: 0  Post Session:  0/10     Therapeutic Activity: (    ):  Therapeutic activities including  Chair transfers, [facility] transfers, AP's and Ambulation on level ground to improve mobility, strength, balance and coordination. Required maximal Safety awareness training;Manual cues; Tactile cues; Verbal cues to promote static and dynamic balance in standing, promote coordination of bilateral, lower extremity(s) and promote motor control of bilateral, lower extremity(s). Gait Training (  10 Minutes):  Gait training to improve and/or restore physical functioning as related to mobility, strength and balance. Ambulated 40 Feet (ft) with Minimal assistance using a Walker, rolling and minimal Safety awareness training;Manual cues; Tactile cues; Verbal cues related to their sequencing, walker manipulation, step length, weight shifts, UE support on rolling walker and posture to promote proper body alignment, promote proper body posture and promote proper body mechanics. Instruction in performance of the above deficits to correct overall gait quality and functional mobility. Therapeutic Exercise: (  14 Minurtes):  Exercises per grid below to improve mobility, strength, balance and stiffness/soreness. Required moderate visual, verbal, manual and tactile cues to promote proper body alignment, promote proper body posture and promote proper body mechanics. Progressed range, repetitions and complexity of movement as indicated. Date:  6/26/18 Date:   Date:     ACTIVITY/EXERCISE AM PM AM PM AM PM   Ambulation:           Distance  Device  Duration         Seated Heel Raises x15B A        Seated Toe Raises x15B A        Seated Long Arc Quads x15B A        Seated Marching x15B  AA-L, A-R        Seated Hip Abduction x15B  AA-L, A-R                 B = bilateral; AA = active assistive; A = active; P = passive          Braces/Orthotics/Lines/Etc:   · IV  Treatment/Session Assessment:    · Response to Treatment:  See above  · Interdisciplinary Collaboration:   o Physical Therapy Assistant  o Registered Nurse  · After treatment position/precautions:   o Supine in bed  o Bed alarm/tab alert on  o Bed/Chair-wheels locked  o Bed in low position  o Call light within reach  o RN notified  o Family at bedside   · Compliance with Program/Exercises: Will assess as treatment progresses. · Recommendations/Intent for next treatment session: \"Next visit will focus on advancements to more challenging activities and reduction in assistance provided\".   Total Treatment Duration:  PT Patient Time In/Time Out  Time In: 0948  Time Out: Ryan Melgoza PTA

## 2018-06-26 NOTE — PROGRESS NOTES
Problem: Falls - Risk of  Goal: *Absence of Falls  Document Kali Fall Risk and appropriate interventions in the flowsheet. Outcome: Progressing Towards Goal  Fall Risk Interventions:  Mobility Interventions: Bed/chair exit alarm, OT consult for ADLs, Patient to call before getting OOB, PT Consult for mobility concerns, PT Consult for assist device competence    Mentation Interventions: Bed/chair exit alarm, Evaluate medications/consider consulting pharmacy    Medication Interventions: Evaluate medications/consider consulting pharmacy    Elimination Interventions: Bed/chair exit alarm, Elevated toilet seat    History of Falls Interventions: Bed/chair exit alarm, Evaluate medications/consider consulting pharmacy        Problem: Pressure Injury - Risk of  Goal: *Prevention of pressure injury  Document Bertram Scale and appropriate interventions in the flowsheet. Outcome: Progressing Towards Goal  Pressure Injury Interventions:  Sensory Interventions: Assess changes in LOC, Discuss PT/OT consult with provider    Moisture Interventions: Absorbent underpads, Limit adult briefs    Activity Interventions: Increase time out of bed, Pressure redistribution bed/mattress(bed type), PT/OT evaluation    Mobility Interventions: HOB 30 degrees or less, Pressure redistribution bed/mattress (bed type), PT/OT evaluation, Turn and reposition approx.  every two hours(pillow and wedges)    Nutrition Interventions: Document food/fluid/supplement intake    Friction and Shear Interventions: HOB 30 degrees or less

## 2018-06-26 NOTE — PROGRESS NOTES
Problem: Interdisciplinary Rounds  Goal: Interdisciplinary Rounds  Outcome: Progressing Towards Goal  Interdisciplinary team rounds were held 6/26/2018 with the following team members:Care Management, Nurse Practitioner, Occupational Therapy and . Anticipate discharge to [facility] today. Plan of care discussed. See clinical pathway and/or care plan for interventions and desired outcomes.

## 2018-06-27 ENCOUNTER — HOSPITAL ENCOUNTER (OUTPATIENT)
Dept: LAB | Age: 78
Discharge: HOME | End: 2018-06-27

## 2018-06-27 LAB
ABO + RH BLD: NORMAL
ANION GAP SERPL CALC-SCNC: 15 MMOL/L (ref 7–16)
BASOPHILS # BLD: 0 K/UL (ref 0–0.2)
BASOPHILS NFR BLD: 0 % (ref 0–2)
BLD PROD TYP BPU: NORMAL
BLOOD GROUP ANTIBODIES SERPL: NORMAL
BPU ID: NORMAL
BUN SERPL-MCNC: 14 MG/DL (ref 8–23)
CALCIUM SERPL-MCNC: 8.2 MG/DL (ref 8.3–10.4)
CHLORIDE SERPL-SCNC: 99 MMOL/L (ref 98–107)
CO2 SERPL-SCNC: 23 MMOL/L (ref 21–32)
CREAT SERPL-MCNC: 0.46 MG/DL (ref 0.6–1)
CROSSMATCH RESULT,%XM: NORMAL
DIFFERENTIAL METHOD BLD: ABNORMAL
EOSINOPHIL # BLD: 0 K/UL (ref 0–0.8)
EOSINOPHIL NFR BLD: 0 % (ref 0.5–7.8)
ERYTHROCYTE [DISTWIDTH] IN BLOOD BY AUTOMATED COUNT: 13.8 % (ref 11.9–14.6)
GLUCOSE SERPL-MCNC: 132 MG/DL (ref 65–100)
HCT VFR BLD AUTO: 34.7 % (ref 35.8–46.3)
HGB BLD-MCNC: 11.9 G/DL (ref 11.7–15.4)
IMM GRANULOCYTES # BLD: 0.1 K/UL (ref 0–0.5)
IMM GRANULOCYTES NFR BLD AUTO: 1 % (ref 0–5)
LYMPHOCYTES # BLD: 0.6 K/UL (ref 0.5–4.6)
LYMPHOCYTES NFR BLD: 6 % (ref 13–44)
MCH RBC QN AUTO: 30.7 PG (ref 26.1–32.9)
MCHC RBC AUTO-ENTMCNC: 34.3 G/DL (ref 31.4–35)
MCV RBC AUTO: 89.7 FL (ref 79.6–97.8)
MONOCYTES # BLD: 0.7 K/UL (ref 0.1–1.3)
MONOCYTES NFR BLD: 7 % (ref 4–12)
NEUTS SEG # BLD: 8.1 K/UL (ref 1.7–8.2)
NEUTS SEG NFR BLD: 86 % (ref 43–78)
PLATELET # BLD AUTO: 227 K/UL (ref 150–450)
PMV BLD AUTO: 10.1 FL (ref 10.8–14.1)
POTASSIUM SERPL-SCNC: 3.2 MMOL/L (ref 3.5–5.1)
RBC # BLD AUTO: 3.87 M/UL (ref 4.05–5.25)
SODIUM SERPL-SCNC: 137 MMOL/L (ref 136–145)
SPECIMEN EXP DATE BLD: NORMAL
STATUS OF UNIT,%ST: NORMAL
UNIT DIVISION, %UDIV: 0
WBC # BLD AUTO: 9.4 K/UL (ref 4.3–11.1)

## 2018-06-27 PROCEDURE — 80048 BASIC METABOLIC PNL TOTAL CA: CPT | Performed by: NURSE PRACTITIONER

## 2018-06-27 PROCEDURE — 85025 COMPLETE CBC W/AUTO DIFF WBC: CPT | Performed by: NURSE PRACTITIONER

## 2018-07-03 NOTE — CONSULTS
Geriatric Fracture Consult  Patient: Clara Sauceda  YOB: 1940   MRN: [id number]      Consult Date:6/24/2018     Consulting Physician: DR Dave Rehman    Chief Complaint: LEFT INTERTROCHANTERIC HIP FRACTURE; OSTEOPOROSIS  History of Present Illness: Kassi Berry is a 65 y.o.  female who is being seen for left hip pain after sustaining a fall at home while walking to the living room. Onset of symptoms was abrupt with unchanged course since that time. The pain is located in the left hip. Patient describes the pain as continuous and rated as moderate. Pain has been associated with movement. Patient denies other injuries. Review of Systems: A comprehensive review of systems was negative except for that written in the History of Present Illness. ED Presentation Time: < 8 hours  Mechanism of Injury: Fall from standing  Ambulatory Status: Jenny Kenny  Past Medical History:   Past Medical History:   Diagnosis Date    CAD (coronary artery disease)     stents    Hypertension        Allergies: Allergies   Allergen Reactions    Donepezil Anaphylaxis    Memantine Anaphylaxis    Tegaserod Hydrogen Maleate Other (comments)     Lethargy      Atorvastatin Myalgia    Calcitonin ([place]) Unknown (comments)    Colesevelam Myalgia    Ezetimibe Myalgia    Fluvastatin Myalgia    Ibandronate Sodium Other (comments)     Abdominal Pain    Risedronate Other (comments)     Abdominal pain      Rosuvastatin Myalgia    Rosuvastatin Calcium Myalgia    Sertraline Anxiety and Other (comments)     Agitation      Simvastatin Myalgia    Sulfa (Sulfonamide Antibiotics) Nausea and Vomiting      Past Surgical History:   Past Surgical History:   Procedure Laterality Date    HX HYSTERECTOMY        Social History:   Social History     Social History    Marital status:      Spouse name: N/A    Number of children: N/A    Years of education: N/A     Occupational History    Not on file.      Social History Main Topics    Smoking status: Former Smoker    Smokeless tobacco: Not on file    Alcohol use No    Drug use: No    Sexual activity: Not on file     Other Topics Concern    Not on file     Social History Narrative      FAMILY HISTORY - REVIEWED - NO PERTINENT FAMILY  Dwelling Status: LIVES IN OWN HOUSE - FAMILY STAYS 24/7  Current Anticoagulant Medications: Aspirin 81 MG/DAY  History of falls: YES  Prior Fractures: DENIES  Osteoporosis Medications: none  Bone Density Tests: UNKNOWN  X-RAYS: Left Intertrochanteric Fracture  Physical Exam:   PATIENT COMPLAINING OF LEFT HIP PAIN AFTER A FALL AT HOME. FOCUSED MUSCULOSKELETAL EXAM REVEALS DECREASED ROM TO LEFT LE. THERE IS SHORTENING AND EXTERNAL ROTATION NOTED TO LEFT LE. PATIENT IS TENDER TO PALPATION OVER LEFT HIP AND GROIN. PATIENT HAS PAIN WITH LOG ROLLING. N/V INTACT. DENIES OTHER INJURIES.     Assessment / Plan: ORIF LEFT IT FRACTURE WITH IM NAIL; CONSULT PT/OT - WBAT LEFT LE; STR  RISKS AND BENEFITS WERE ADDRESSED WITH PATIENT AND FAMILY  Labs:    Lab Results   Component Value Date/Time    HGB 11.9 06/27/2018 10:50 AM    WBC 9.4 06/27/2018 10:50 AM    INR 1.0 06/23/2018 09:35 AM      Preoperative Clearance: YES by Hospitalist          Signed by: Eveline aPrks NP   Today's Date: July 3, 2018

## (undated) DEVICE — SUTURE MCRYL SZ 0 L27IN ABSRB VLT CT-1 L36MM 1/2 CIR TAPR Y340H

## (undated) DEVICE — 3M™ TEGADERM™ TRANSPARENT FILM DRESSING FRAME STYLE, 1628, 6 IN X 8 IN (15 CM X 20 CM), 10/CT 8CT/CASE: Brand: 3M™ TEGADERM™

## (undated) DEVICE — INTENDED FOR TISSUE SEPARATION, AND OTHER PROCEDURES THAT REQUIRE A SHARP SURGICAL BLADE TO PUNCTURE OR CUT.: Brand: BARD-PARKER ® STAINLESS STEEL BLADES

## (undated) DEVICE — SOLUTION IV 1000ML 0.9% SOD CHL

## (undated) DEVICE — (D)DRAPE ISOL ANTIMC 129X100IN -- DISC BY MFR

## (undated) DEVICE — OCCLUSIVE GAUZE STRIP,3% BISMUTH TRIBROMOPHENATE IN PETROLATUM BLEND: Brand: XEROFORM

## (undated) DEVICE — (D)PREP SKN CHLRAPRP APPL 26ML -- CONVERT TO ITEM 371833

## (undated) DEVICE — BUTTON SWITCH PENCIL BLADE ELECTRODE, HOLSTER: Brand: EDGE

## (undated) DEVICE — SUTURE MCRYL SZ 2-0 L27IN ABSRB VLT CT-1 L36MM 1/2 CIR TAPR Y339H

## (undated) DEVICE — AMD ANTIMICROBIAL GAUZE SPONGES,12 PLY USP TYPE VII, 0.2% POLYHEXAMETHYLENE BIGUANIDE HCI (PHMB): Brand: CURITY

## (undated) DEVICE — X-LARGE COTTON GLOVE: Brand: DEROYAL

## (undated) DEVICE — SURGICAL PROCEDURE PACK BASIC ST FRANCIS

## (undated) DEVICE — SLIM BODY SKIN STAPLER: Brand: APPOSE ULC

## (undated) DEVICE — 1010 S-DRAPE TOWEL DRAPE 10/BX: Brand: STERI-DRAPE™

## (undated) DEVICE — ROD RMR L950MM DIA2.5MM W/ EXTN BALL TIP

## (undated) DEVICE — 3.2MM GUIDE WIRE 400MM

## (undated) DEVICE — 2000CC GUARDIAN II: Brand: GUARDIAN

## (undated) DEVICE — REM POLYHESIVE ADULT PATIENT RETURN ELECTRODE: Brand: VALLEYLAB